# Patient Record
Sex: MALE | Race: WHITE | NOT HISPANIC OR LATINO | Employment: FULL TIME | URBAN - METROPOLITAN AREA
[De-identification: names, ages, dates, MRNs, and addresses within clinical notes are randomized per-mention and may not be internally consistent; named-entity substitution may affect disease eponyms.]

---

## 2020-02-06 LAB — HBA1C MFR BLD HPLC: 5.8 %

## 2020-03-03 RX ORDER — IBUPROFEN 600 MG/1
TABLET ORAL
COMMUNITY
End: 2020-03-09 | Stop reason: ALTCHOICE

## 2020-03-03 RX ORDER — CHLORAL HYDRATE 500 MG
CAPSULE ORAL
COMMUNITY

## 2020-03-03 RX ORDER — VALSARTAN AND HYDROCHLOROTHIAZIDE 160; 12.5 MG/1; MG/1
TABLET, FILM COATED ORAL
COMMUNITY
Start: 2019-08-07 | End: 2020-10-06

## 2020-03-05 ENCOUNTER — TELEPHONE (OUTPATIENT)
Dept: INTERNAL MEDICINE CLINIC | Facility: CLINIC | Age: 60
End: 2020-03-05

## 2020-03-05 ENCOUNTER — OFFICE VISIT (OUTPATIENT)
Dept: INTERNAL MEDICINE CLINIC | Facility: CLINIC | Age: 60
End: 2020-03-05
Payer: COMMERCIAL

## 2020-03-05 VITALS
BODY MASS INDEX: 26.63 KG/M2 | RESPIRATION RATE: 16 BRPM | OXYGEN SATURATION: 95 % | HEART RATE: 77 BPM | HEIGHT: 75 IN | DIASTOLIC BLOOD PRESSURE: 89 MMHG | SYSTOLIC BLOOD PRESSURE: 148 MMHG | WEIGHT: 214.2 LBS

## 2020-03-05 DIAGNOSIS — S76.111A STRAIN OF RIGHT QUADRICEPS, INITIAL ENCOUNTER: ICD-10-CM

## 2020-03-05 DIAGNOSIS — Z23 NEED FOR SHINGLES VACCINE: ICD-10-CM

## 2020-03-05 DIAGNOSIS — R03.0 PRE-HYPERTENSION: ICD-10-CM

## 2020-03-05 DIAGNOSIS — R73.03 PREDIABETES: ICD-10-CM

## 2020-03-05 DIAGNOSIS — Z23 NEED FOR TDAP VACCINATION: Primary | ICD-10-CM

## 2020-03-05 DIAGNOSIS — E66.3 OVERWEIGHT (BMI 25.0-29.9): ICD-10-CM

## 2020-03-05 DIAGNOSIS — K57.90 DIVERTICULOSIS: ICD-10-CM

## 2020-03-05 PROBLEM — I10 HYPERTENSION: Status: ACTIVE | Noted: 2020-03-05

## 2020-03-05 PROBLEM — K58.0 IRRITABLE BOWEL SYNDROME WITH DIARRHEA: Status: ACTIVE | Noted: 2020-03-05

## 2020-03-05 PROCEDURE — 3077F SYST BP >= 140 MM HG: CPT | Performed by: INTERNAL MEDICINE

## 2020-03-05 PROCEDURE — 3079F DIAST BP 80-89 MM HG: CPT | Performed by: INTERNAL MEDICINE

## 2020-03-05 PROCEDURE — 3008F BODY MASS INDEX DOCD: CPT | Performed by: INTERNAL MEDICINE

## 2020-03-05 PROCEDURE — 99203 OFFICE O/P NEW LOW 30 MIN: CPT | Performed by: INTERNAL MEDICINE

## 2020-03-05 PROCEDURE — 1036F TOBACCO NON-USER: CPT | Performed by: INTERNAL MEDICINE

## 2020-03-05 NOTE — PROGRESS NOTES
Assessment/Plan:    Diverticulosis  Currently stable doing well increase fiber in the diet reduce fat in the diet will continue monitor colonoscopy is up-to-date    Strain of right quadriceps  Of the right lower extremity and quadriceps recommended will also have the patient see physical therapy trigger the    Need for shingles vaccine  I have given the patient Rx for Shingrix vaccine that he can receive at the pharmacy    Need for Tdap vaccination  Counseled, patient request Rx for Tdap he will receive an at the pharmacy    Overweight (BMI 25 0-29  9)  I have counselled the pt to follow a healthy and balanced diet ,and recommend routine exercise  Prediabetes  Reduce carbohydrates, reduce sweets routine exercise continue monitor 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 which she will bring a copy to me to monitor  Pre-hypertension  Weight loss, routine exercise, monitor the blood pressure reduce the diet if he notices increase of the blood pressure please notify me immediately will try to optimize his blood pressure target 110-120/60-80         Problem List Items Addressed This Visit        Digestive    Diverticulosis     Currently stable doing well increase fiber in the diet reduce fat in the diet will continue monitor colonoscopy is up-to-date            Musculoskeletal and Integument    Strain of right quadriceps     Of the right lower extremity and quadriceps recommended will also have the patient see physical therapy trigger the         Relevant Orders    Ambulatory referral to Physical Therapy       Other    Overweight (BMI 25 0-29  9)     I have counselled the pt to follow a healthy and balanced diet ,and recommend routine exercise  Prediabetes     Reduce carbohydrates, reduce sweets routine exercise continue monitor 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 which she will bring a copy to me to monitor           Need for Tdap vaccination - Primary     Counseled, patient request Rx for Tdap he will receive an at the pharmacy         Need for shingles vaccine     I have given the patient Rx for Shingrix vaccine that he can receive at the pharmacy         Pre-hypertension     Weight loss, routine exercise, monitor the blood pressure reduce the diet if he notices increase of the blood pressure please notify me immediately will try to optimize his blood pressure target 110-120/60-80               Return to office 12  months  call if any problems  Subjective:      Patient ID: Rene Bray is a 64 y o  male  HPI 65-year old male new patient visit diverticulosis, overweight, prediabetes and pre hypertension; 135-140/80-89 diet healthy diet, fish , pasta, some chips intermittently,  Walking the dog 1/2, , 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 he reports reports me that he get 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  He will bring me copies of these reports  BMI Counseling: Body mass index is 27 13 kg/m²  The BMI is above normal  Nutrition recommendations include decreasing portion sizes and moderation in carbohydrate intake  The following portions of the patient's history were reviewed and updated as appropriate: allergies, current medications, past family history, past medical history, past social history, past surgical history and problem list     Review of Systems   Constitutional: Negative for activity change, appetite change and unexpected weight change  HENT: Negative for congestion and postnasal drip  Eyes: Negative for visual disturbance  Respiratory: Negative for cough and shortness of breath  Cardiovascular: Negative for chest pain  Gastrointestinal: Negative for abdominal pain, diarrhea, nausea and vomiting  Neurological: Negative for dizziness, light-headedness and headaches  Hematological: Negative for adenopathy  Objective:    No follow-ups on file  No results found        Allergies Allergen Reactions    No Active Allergies        History reviewed  No pertinent past medical history  Past Surgical History:   Procedure Laterality Date    THYROIDECTOMY, PARTIAL       Current Outpatient Medications on File Prior to Visit   Medication Sig Dispense Refill    ibuprofen (MOTRIN) 600 mg tablet TAKE ONE TABLET BY MOUTH EVERY 6 HOURS FOR THE first 48 HOURS THEN AS NEEDED FOR PAIN      Olive Leaf Extract 150 MG CAPS olive leaf extract      Omega-3 Fatty Acids (FISH OIL) 1,000 mg Fish Oil      Turmeric 1053 MG TABS turmeric (bulk)      valsartan-hydrochlorothiazide (DIOVAN-HCT) 160-12 5 MG per tablet TAKE 1 TABLET(S) EVERY DAY BY ORAL ROUTE IN THE MORNING  No current facility-administered medications on file prior to visit        Family History   Problem Relation Age of Onset   [facility] Breast cancer Mother     Hypertension Mother     Atrial fibrillation Mother     Coronary artery disease Father     Dementia Father      Social History     Socioeconomic History    Marital status: /Civil Union     Spouse name: Not on file    Number of children: Not on file    Years of education: Not on file    Highest education level: Not on file   Occupational History    Not on file   Social Needs    Financial resource strain: Not on file    Food insecurity:     Worry: Not on file     Inability: Not on file    Transportation needs:     Medical: Not on file     Non-medical: Not on file   Tobacco Use    Smoking status: Never Smoker    Smokeless tobacco: Never Used   Substance and Sexual Activity    Alcohol use: Not on file    Drug use: Not on file    Sexual activity: Not on file   Lifestyle    Physical activity:     Days per week: Not on file     Minutes per session: Not on file    Stress: Not on file   Relationships    Social connections:     Talks on phone: Not on file     Gets together: Not on file     Attends Baptism service: Not on file     Active member of club or organization: Not on file     Attends meetings of clubs or organizations: Not on file     Relationship status: Not on file    Intimate partner violence:     Fear of current or ex partner: Not on file     Emotionally abused: Not on file     Physically abused: Not on file     Forced sexual activity: Not on file   Other Topics Concern    Not on file   Social History Narrative    Not on file     Vitals:    03/05/20 1144   BP: 148/89   Pulse: 77   Resp: 16   SpO2: 95%   Weight: 97 2 kg (214 lb 3 2 oz)   Height: 6' 2 5" (1 892 m)     Results for orders placed or performed in visit on 02/06/20   Hemoglobin A1C   Result Value Ref Range    Hemoglobin A1C 5 8      Weight (last 2 days)     None        Body mass index is 27 13 kg/m²  BP      Temp      Pulse     Resp      SpO2        Vitals:    03/05/20 1144   Weight: 97 2 kg (214 lb 3 2 oz)     Vitals:    03/05/20 1144   Weight: 97 2 kg (214 lb 3 2 oz)       /89   Pulse 77   Resp 16   Ht 6' 2 5" (1 892 m)   Wt 97 2 kg (214 lb 3 2 oz)   SpO2 95%   BMI 27 13 kg/m²          Physical Exam   Constitutional: He appears well-developed and well-nourished  No distress  HENT:   Head: Normocephalic and atraumatic  Right Ear: External ear normal    Left Ear: External ear normal    Mouth/Throat: Oropharynx is clear and moist    Eyes: Pupils are equal, round, and reactive to light  Conjunctivae are normal  Right eye exhibits no discharge  Left eye exhibits no discharge  No scleral icterus  Neck: Neck supple  Cardiovascular: Normal rate, regular rhythm and normal heart sounds  Exam reveals no gallop and no friction rub  No murmur heard  Pulmonary/Chest: No respiratory distress  He has no wheezes  He has no rales  Abdominal: Soft  Bowel sounds are normal  He exhibits no distension and no mass  There is no tenderness  There is no rebound and no guarding  Musculoskeletal: He exhibits no edema or deformity  Lymphadenopathy:     He has no cervical adenopathy     Neurological: He is alert    Skin: He is not diaphoretic  Psychiatric: He has a normal mood and affect

## 2020-03-05 NOTE — TELEPHONE ENCOUNTER
----- Message from Manuel Siddiqi DO sent at 3/5/2020 12:17 PM EST -----  Please get a copy of the last colonoscopy by GI Dr Ariana Rivera

## 2020-03-05 NOTE — TELEPHONE ENCOUNTER
LM on nurse line at Dr Amie Guardado office requesting copy of colonoscopy/pathology report      [phone]

## 2020-03-08 PROBLEM — Z23 NEED FOR TDAP VACCINATION: Status: ACTIVE | Noted: 2020-03-08

## 2020-03-08 PROBLEM — R03.0 PRE-HYPERTENSION: Status: ACTIVE | Noted: 2020-03-08

## 2020-03-08 PROBLEM — Z23 NEED FOR SHINGLES VACCINE: Status: ACTIVE | Noted: 2020-03-08

## 2020-03-08 PROBLEM — S76.111A STRAIN OF RIGHT QUADRICEPS: Status: ACTIVE | Noted: 2020-03-08

## 2020-03-08 NOTE — ASSESSMENT & PLAN NOTE
Currently stable doing well increase fiber in the diet reduce fat in the diet will continue monitor colonoscopy is up-to-date

## 2020-03-08 NOTE — ASSESSMENT & PLAN NOTE
Weight loss, routine exercise, monitor the blood pressure reduce the diet if he notices increase of the blood pressure please notify me immediately will try to optimize his blood pressure target 110-120/60-80

## 2020-03-08 NOTE — ASSESSMENT & PLAN NOTE
Of the right lower extremity and quadriceps recommended will also have the patient see physical therapy trigger the

## 2020-03-08 NOTE — ASSESSMENT & PLAN NOTE
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

## 2020-04-29 DIAGNOSIS — I10 ESSENTIAL HYPERTENSION: Primary | ICD-10-CM

## 2020-04-29 RX ORDER — HYDROCHLOROTHIAZIDE 12.5 MG/1
12.5 TABLET ORAL DAILY
Qty: 30 TABLET | Refills: 0 | Status: SHIPPED | OUTPATIENT
Start: 2020-04-29 | End: 2020-05-18 | Stop reason: SDUPTHER

## 2020-04-29 RX ORDER — VALSARTAN 160 MG/1
160 TABLET ORAL DAILY
Qty: 30 TABLET | Refills: 0 | Status: SHIPPED | OUTPATIENT
Start: 2020-04-29 | End: 2020-05-18 | Stop reason: SDUPTHER

## 2020-05-18 DIAGNOSIS — I10 ESSENTIAL HYPERTENSION: ICD-10-CM

## 2020-05-18 RX ORDER — HYDROCHLOROTHIAZIDE 12.5 MG/1
12.5 TABLET ORAL DAILY
Qty: 90 TABLET | Refills: 0 | Status: SHIPPED | OUTPATIENT
Start: 2020-05-18 | End: 2020-05-21

## 2020-05-18 RX ORDER — VALSARTAN 160 MG/1
160 TABLET ORAL DAILY
Qty: 90 TABLET | Refills: 0 | Status: SHIPPED | OUTPATIENT
Start: 2020-05-18 | End: 2020-05-21

## 2020-05-21 DIAGNOSIS — I10 ESSENTIAL HYPERTENSION: ICD-10-CM

## 2020-05-21 RX ORDER — HYDROCHLOROTHIAZIDE 12.5 MG/1
TABLET ORAL
Qty: 30 TABLET | Refills: 0 | Status: SHIPPED | OUTPATIENT
Start: 2020-05-21 | End: 2020-05-22

## 2020-05-21 RX ORDER — VALSARTAN 160 MG/1
TABLET ORAL
Qty: 30 TABLET | Refills: 0 | Status: SHIPPED | OUTPATIENT
Start: 2020-05-21 | End: 2020-07-09

## 2020-05-22 DIAGNOSIS — I10 ESSENTIAL HYPERTENSION: ICD-10-CM

## 2020-05-22 RX ORDER — HYDROCHLOROTHIAZIDE 12.5 MG/1
TABLET ORAL
Qty: 90 TABLET | Refills: 1 | Status: SHIPPED | OUTPATIENT
Start: 2020-05-22 | End: 2020-07-09

## 2020-07-08 DIAGNOSIS — I10 ESSENTIAL HYPERTENSION: ICD-10-CM

## 2020-07-09 RX ORDER — VALSARTAN 160 MG/1
TABLET ORAL
Qty: 90 TABLET | Refills: 1 | Status: SHIPPED | OUTPATIENT
Start: 2020-07-09 | End: 2020-12-21

## 2020-07-09 RX ORDER — HYDROCHLOROTHIAZIDE 12.5 MG/1
TABLET ORAL
Qty: 90 TABLET | Refills: 1 | Status: SHIPPED | OUTPATIENT
Start: 2020-07-09 | End: 2020-12-21

## 2020-08-31 ENCOUNTER — TELEPHONE (OUTPATIENT)
Dept: INTERNAL MEDICINE CLINIC | Facility: CLINIC | Age: 60
End: 2020-08-31

## 2020-08-31 DIAGNOSIS — Z13.6 SCREENING FOR CARDIOVASCULAR CONDITION: ICD-10-CM

## 2020-08-31 DIAGNOSIS — Z12.5 SCREENING FOR PROSTATE CANCER: Primary | ICD-10-CM

## 2020-08-31 NOTE — TELEPHONE ENCOUNTER
Please have the patient go for a comprehensive metabolic panel, lipid panel and PSA diagnosis prostate cancer screening, cardiovascular screening

## 2020-08-31 NOTE — TELEPHONE ENCOUNTER
Patient said he usually gets b/w done at a [facility] event but he is not going there this year so the pt wants to know if you want him to get b/w done  Pt uses [facility]  Please, advise

## 2020-10-06 ENCOUNTER — OFFICE VISIT (OUTPATIENT)
Dept: INTERNAL MEDICINE CLINIC | Facility: CLINIC | Age: 60
End: 2020-10-06
Payer: COMMERCIAL

## 2020-10-06 VITALS
WEIGHT: 216 LBS | HEIGHT: 75 IN | HEART RATE: 73 BPM | DIASTOLIC BLOOD PRESSURE: 72 MMHG | SYSTOLIC BLOOD PRESSURE: 122 MMHG | OXYGEN SATURATION: 98 % | TEMPERATURE: 97.9 F | BODY MASS INDEX: 26.86 KG/M2

## 2020-10-06 DIAGNOSIS — R73.03 PREDIABETES: Primary | ICD-10-CM

## 2020-10-06 DIAGNOSIS — E78.5 HYPERLIPIDEMIA, UNSPECIFIED HYPERLIPIDEMIA TYPE: ICD-10-CM

## 2020-10-06 DIAGNOSIS — F41.9 MILD ANXIETY: ICD-10-CM

## 2020-10-06 DIAGNOSIS — Z13.29 THYROID DISORDER SCREEN: ICD-10-CM

## 2020-10-06 DIAGNOSIS — E66.3 OVERWEIGHT (BMI 25.0-29.9): ICD-10-CM

## 2020-10-06 DIAGNOSIS — K58.0 IRRITABLE BOWEL SYNDROME WITH DIARRHEA: ICD-10-CM

## 2020-10-06 DIAGNOSIS — Z11.59 ENCOUNTER FOR HEPATITIS C SCREENING TEST FOR LOW RISK PATIENT: ICD-10-CM

## 2020-10-06 PROCEDURE — 99214 OFFICE O/P EST MOD 30 MIN: CPT | Performed by: INTERNAL MEDICINE

## 2020-10-06 PROCEDURE — 1036F TOBACCO NON-USER: CPT | Performed by: INTERNAL MEDICINE

## 2020-10-06 PROCEDURE — 3078F DIAST BP <80 MM HG: CPT | Performed by: INTERNAL MEDICINE

## 2020-12-01 ENCOUNTER — VBI (OUTPATIENT)
Dept: ADMINISTRATIVE | Facility: OTHER | Age: 60
End: 2020-12-01

## 2020-12-21 DIAGNOSIS — I10 ESSENTIAL HYPERTENSION: ICD-10-CM

## 2020-12-21 RX ORDER — HYDROCHLOROTHIAZIDE 12.5 MG/1
TABLET ORAL
Qty: 90 TABLET | Refills: 3 | Status: SHIPPED | OUTPATIENT
Start: 2020-12-21 | End: 2021-11-19

## 2020-12-21 RX ORDER — VALSARTAN 160 MG/1
TABLET ORAL
Qty: 90 TABLET | Refills: 3 | Status: SHIPPED | OUTPATIENT
Start: 2020-12-21 | End: 2021-11-19

## 2021-04-02 ENCOUNTER — TELEPHONE (OUTPATIENT)
Dept: INTERNAL MEDICINE CLINIC | Facility: CLINIC | Age: 61
End: 2021-04-02

## 2021-04-02 NOTE — TELEPHONE ENCOUNTER
The patient would like to have the Hep A and B antibody testing done in order to see if he  needs to have the vaccines  Please advise  Thank you

## 2021-04-06 DIAGNOSIS — Z00.00 HEALTH MAINTENANCE EXAMINATION: Primary | ICD-10-CM

## 2021-04-06 LAB
HBA1C MFR BLD HPLC: 5.6 %
HCV AB SER-ACNC: NON REACTIVE

## 2021-04-09 RX ORDER — TRIAMCINOLONE ACETONIDE 0.1 %
PASTE (GRAM) DENTAL
COMMUNITY
Start: 2021-01-21

## 2021-04-14 ENCOUNTER — OFFICE VISIT (OUTPATIENT)
Dept: INTERNAL MEDICINE CLINIC | Facility: CLINIC | Age: 61
End: 2021-04-14
Payer: COMMERCIAL

## 2021-04-14 VITALS
RESPIRATION RATE: 16 BRPM | SYSTOLIC BLOOD PRESSURE: 130 MMHG | WEIGHT: 214.6 LBS | DIASTOLIC BLOOD PRESSURE: 84 MMHG | BODY MASS INDEX: 26.68 KG/M2 | HEART RATE: 67 BPM | OXYGEN SATURATION: 98 % | HEIGHT: 75 IN

## 2021-04-14 DIAGNOSIS — Z23 NEED FOR DIPHTHERIA-TETANUS-PERTUSSIS (TDAP) VACCINE: ICD-10-CM

## 2021-04-14 DIAGNOSIS — Z11.4 SCREENING FOR HIV (HUMAN IMMUNODEFICIENCY VIRUS): ICD-10-CM

## 2021-04-14 DIAGNOSIS — Z23 ENCOUNTER FOR IMMUNIZATION: ICD-10-CM

## 2021-04-14 DIAGNOSIS — Z00.00 WELLNESS EXAMINATION: ICD-10-CM

## 2021-04-14 DIAGNOSIS — Z11.59 NEED FOR HEPATITIS C SCREENING TEST: ICD-10-CM

## 2021-04-14 DIAGNOSIS — I15.9 SECONDARY HYPERTENSION: Primary | ICD-10-CM

## 2021-04-14 DIAGNOSIS — R73.01 ELEVATED FASTING BLOOD SUGAR: ICD-10-CM

## 2021-04-14 DIAGNOSIS — F41.9 MILD ANXIETY: ICD-10-CM

## 2021-04-14 DIAGNOSIS — E78.5 HYPERLIPIDEMIA, UNSPECIFIED HYPERLIPIDEMIA TYPE: ICD-10-CM

## 2021-04-14 PROCEDURE — 3008F BODY MASS INDEX DOCD: CPT | Performed by: INTERNAL MEDICINE

## 2021-04-14 PROCEDURE — 3725F SCREEN DEPRESSION PERFORMED: CPT | Performed by: INTERNAL MEDICINE

## 2021-04-14 PROCEDURE — 1036F TOBACCO NON-USER: CPT | Performed by: INTERNAL MEDICINE

## 2021-04-14 PROCEDURE — 99396 PREV VISIT EST AGE 40-64: CPT | Performed by: INTERNAL MEDICINE

## 2021-04-14 NOTE — ASSESSMENT & PLAN NOTE
Pre diabetes -I have counseled the patient to follow a healthy balanced diet, check hemoglobin A1c and fasting blood sugar

## 2021-04-14 NOTE — PATIENT INSTRUCTIONS
Try Debrox wax softener as directed p r  n  Heart Healthy Diet   WHAT YOU NEED TO KNOW:   A heart healthy diet is an eating plan low in unhealthy fats and sodium (salt)  The plan is high in healthy fats and fiber  A heart healthy diet helps improve your cholesterol levels and lowers your risk for heart disease and stroke  A dietitian will teach you how to read and understand food labels  DISCHARGE INSTRUCTIONS:   Heart healthy diet guidelines to follow:   · Choose foods that contain healthy fats  ? Unsaturated fats  include monounsaturated and polyunsaturated fats  Unsaturated fat is found in foods such as soybean, canola, olive, corn, and safflower oils  It is also found in soft tub margarine that is made with liquid vegetable oil  ? Omega-3 fat  is found in certain fish, such as salmon, tuna, and trout, and in walnuts and flaxseed  Eat fish high in omega-3 fats at least 2 times a week  · Get 20 to 30 grams of fiber each day  Fruits, vegetables, whole-grain foods, and legumes (cooked beans) are good sources of fiber  · Limit or do not have unhealthy fats  ? Cholesterol  is found in animal foods, such as eggs and lobster, and in dairy products made from whole milk  Limit cholesterol to less than 200 mg each day  ? Saturated fat  is found in meats, such as aj and hamburger  It is also found in chicken or turkey skin, whole milk, and butter  Limit saturated fat to less than 7% of your total daily calories  ? Trans fat  is found in packaged foods, such as potato chips and cookies  It is also in hard margarine, some fried foods, and shortening  Do not eat foods that contain trans fats  · Limit sodium as directed  You may be told to limit sodium to 2,000 to 2,300 mg each day  Choose low-sodium or no-salt-added foods  Add little or no salt to food you prepare  Use herbs and spices in place of salt         Include the following in your heart healthy plan:  Ask your dietitian or healthcare provider how many servings to have from each of the following food groups:  · Grains:      ? Whole-wheat breads, cereals, and pastas, and brown rice    ? Low-fat, low-sodium crackers and chips    · Vegetables:      ? Broccoli, green beans, green peas, and spinach    ? Collards, kale, and lima beans    ? Carrots, sweet potatoes, tomatoes, and peppers    ? Canned vegetables with no salt added    · Fruits:      ? Bananas, peaches, pears, and pineapple    ? Grapes, raisins, and dates    ? Oranges, tangerines, grapefruit, orange juice, and grapefruit juice    ? Apricots, mangoes, melons, and papaya    ? Raspberries and strawberries    ? Canned fruit with no added sugar    · Low-fat dairy:      ? Nonfat (skim) milk, 1% milk, and low-fat almond, cashew, or soy milks fortified with calcium    ? Low-fat cheese, regular or frozen yogurt, and cottage cheese    · Meats and proteins:      ? Lean cuts of beef and pork (loin, leg, round), skinless chicken and turkey    ? Legumes, soy products, egg whites, or nuts    Limit or do not include the following in your heart healthy plan:   · Unhealthy fats and oils:      ? Whole or 2% milk, cream cheese, sour cream, or cheese    ? High-fat cuts of beef (T-bone steaks, ribs), chicken or turkey with skin, and organ meats such as liver    ? Butter, stick margarine, shortening, and cooking oils such as coconut or palm oil    · Foods and liquids high in sodium:      ? Packaged foods, such as frozen dinners, cookies, macaroni and cheese, and cereals with more than 300 mg of sodium per serving    ? Vegetables with added sodium, such as instant potatoes, vegetables with added sauces, or regular canned vegetables    ? Cured or smoked meats, such as hot dogs, aj, and sausage    ? High-sodium ketchup, barbecue sauce, salad dressing, pickles, olives, soy sauce, or miso    · Foods and liquids high in sugar:      ? Candy, cake, cookies, pies, or doughnuts    ?  Soft drinks (soda), sports drinks, or sweetened tea    ? Canned or dry mixes for cakes, soups, sauces, or gravies    Other healthy heart guidelines:   · Do not smoke  Nicotine and other chemicals in cigarettes and cigars can cause lung and heart damage  Ask your healthcare provider for information if you currently smoke and need help to quit  E-cigarettes or smokeless tobacco still contain nicotine  Talk to your healthcare provider before you use these products  · Limit or do not drink alcohol as directed  Alcohol can damage your heart and raise your blood pressure  Your healthcare provider may give you specific daily and weekly limits  The general recommended limit is 1 drink a day for women 21 or older and for men 72 or older  Do not have more than 3 drinks in a day or 7 in a week  The recommended limit is 2 drinks a day for men 24to 59years of age  Do not have more than 4 drinks in a day or 14 in a week  A drink of alcohol is 12 ounces of beer, 5 ounces of wine, or 1½ ounces of liquor  · Exercise regularly  Exercise can help you maintain a healthy weight and improve your blood pressure and cholesterol levels  Regular exercise can also decrease your risk for heart problems  Ask your healthcare provider about the best exercise plan for you  Do not start an exercise program without asking your healthcare provider  Follow up with your doctor or cardiologist as directed:  Write down your questions so you remember to ask them during your visits  © Copyright [address] Information is for End User's use only and may not be sold, redistributed or otherwise used for commercial purposes  All illustrations and images included in CareNotes® are the copyrighted property of A D [employer] M [employer]  or [address] [place], [place] [address]delilah   The above information is an  only  It is not intended as medical advice for individual conditions or treatments   Talk to your doctor, nurse or pharmacist before following any medical regimen to see if it is safe and effective for you

## 2021-04-14 NOTE — ASSESSMENT & PLAN NOTE
Assessment and plan 1  Health maintenance annual wellness examination overall the patient is clinically stable and doing well, we encouraged the patient to follow a healthy and balanced diet  We recommend that the patient exercise routinely approximately 30 minutes 5 times per week   We have reviewed the patient's vaccines and have made recommendations for updates if necessary  He has had the COVID vaccine recommend annual flu shot, he has had the Shingrix vaccine       We will be ordering screening laboratories which are age appropriate  Return to the office in    6 months   call if any problems

## 2021-04-14 NOTE — ASSESSMENT & PLAN NOTE
He reports me mild anxiety related to his daughter's medical condition she has been recently diagnosed with colitis fortunately she is improving currently the patient does not report any depression no SI he would like to hold off on counseling like to hold off on treatment we did talk about healthy outlets including exercise meditation I did ask him monitor symptoms are there is any worsening or changes symptoms please notify me

## 2021-04-14 NOTE — PROGRESS NOTES
Assessment/Plan:    Wellness examination   Assessment and plan 1  Health maintenance annual wellness examination overall the patient is clinically stable and doing well, we encouraged the patient to follow a healthy and balanced diet  We recommend that the patient exercise routinely approximately 30 minutes 5 times per week   We have reviewed the patient's vaccines and have made recommendations for updates if necessary  He has had the COVID vaccine recommend annual flu shot, he has had the Shingrix vaccine       We will be ordering screening laboratories which are age appropriate  Return to the office in    6 months   call if any problems  Hyperlipidemia   Low-cholesterol diet check lipid profile    Elevated fasting blood sugar   Pre diabetes -I have counseled the patient to follow a healthy balanced diet, check hemoglobin A1c and fasting blood sugar    Mild anxiety   He reports me mild anxiety related to his daughter's medical condition she has been recently diagnosed with colitis fortunately she is improving currently the patient does not report any depression no SI he would like to hold off on counseling like to hold off on treatment we did talk about healthy outlets including exercise meditation I did ask him monitor symptoms are there is any worsening or changes symptoms please notify me           Problem List Items Addressed This Visit        Cardiovascular and Mediastinum    Hypertension - Primary    Relevant Orders    Comprehensive metabolic panel       Other    Hyperlipidemia      Low-cholesterol diet check lipid profile         Relevant Orders    Lipid Panel with Direct LDL reflex    Mild anxiety      He reports me mild anxiety related to his daughter's medical condition she has been recently diagnosed with colitis fortunately she is improving currently the patient does not report any depression no SI he would like to hold off on counseling like to hold off on treatment we did talk about healthy outlets including exercise meditation I did ask him monitor symptoms are there is any worsening or changes symptoms please notify me  Elevated fasting blood sugar      Pre diabetes -I have counseled the patient to follow a healthy balanced diet, check hemoglobin A1c and fasting blood sugar         Relevant Orders    Hemoglobin A1C    Wellness examination      Assessment and plan 1  Health maintenance annual wellness examination overall the patient is clinically stable and doing well, we encouraged the patient to follow a healthy and balanced diet  We recommend that the patient exercise routinely approximately 30 minutes 5 times per week   We have reviewed the patient's vaccines and have made recommendations for updates if necessary  He has had the COVID vaccine recommend annual flu shot, he has had the Shingrix vaccine       We will be ordering screening laboratories which are age appropriate  Return to the office in    6 months   call if any problems  Other Visit Diagnoses     Need for hepatitis C screening test        Screening for HIV (human immunodeficiency virus)        Encounter for immunization        Need for diphtheria-tetanus-pertussis (Tdap) vaccine        Relevant Medications    tetanus-diphtheria-acellular pertussis (ADACEL) 5-2-15 5 LF-mcg/0 5 injection            RTO in 6 months call if any problems  Subjective:      Patient ID: Zamzam Vera is a 63 y o  male  HPI 65-year-old male coming in for a wellness examination he reports me overall is doing well he drives a car safely he is trying to follow healthy / balance diet he exercises routinely  He needs a prescription for pharmacy for the Tdap vaccine  He has had his COVID vaccine he is here to review his laboratories    Reports me mild stress related to his daughter's medical condition no SI his symptoms are mild he would prefer no treatment he is working on healthy outlets including exercise stress daughter with colitis , No dep no si  Walking  Talking to wife     The following portions of the patient's history were reviewed and updated as appropriate: allergies, current medications, past family history, past medical history, past social history, past surgical history and problem list     Review of Systems   Constitutional: Negative for activity change, appetite change and unexpected weight change  HENT: Negative for congestion and postnasal drip  Eyes: Negative for visual disturbance  Respiratory: Negative for cough and shortness of breath  Cardiovascular: Negative for chest pain  Gastrointestinal: Negative for abdominal pain, diarrhea, nausea and vomiting  Neurological: Negative for dizziness, light-headedness and headaches  Psychiatric/Behavioral: Negative for suicidal ideas  The patient is nervous/anxious  Objective:    No follow-ups on file  No results found  Allergies   Allergen Reactions    No Active Allergies        History reviewed  No pertinent past medical history  Past Surgical History:   Procedure Laterality Date    THYROIDECTOMY, PARTIAL       Current Outpatient Medications on File Prior to Visit   Medication Sig Dispense Refill    hydrochlorothiazide (HYDRODIURIL) 12 5 mg tablet TAKE 1 TABLET BY MOUTH  DAILY 90 tablet 3    Olive Leaf Extract 150 MG CAPS olive leaf extract      Omega-3 Fatty Acids (FISH OIL) 1,000 mg Fish Oil      triamcinolone (KENALOG) 0 1 % oral topical paste APPLY TO AFFECTED AREA AFTER MEALS AND BEFORE BEDTIME      Turmeric 1053 MG TABS turmeric (bulk)      valsartan (DIOVAN) 160 mg tablet TAKE 1 TABLET BY MOUTH  DAILY 90 tablet 3     No current facility-administered medications on file prior to visit        Family History   Problem Relation Age of Onset   Josesito Carr Breast cancer Mother     Hypertension Mother     Atrial fibrillation Mother     Coronary artery disease Father     Dementia Father      Social History     Socioeconomic History    Marital status: /Civil [employer]     Spouse name: Not on file    Number of children: Not on file    Years of education: Not on file    Highest education level: Not on file   Occupational History    Not on file   Social Needs    Financial resource strain: Not on file    Food insecurity     Worry: Not on file     Inability: Not on file    Transportation needs     Medical: Not on file     Non-medical: Not on file   Tobacco Use    Smoking status: Never Smoker    Smokeless tobacco: Never Used   Substance and Sexual Activity    Alcohol use: Not on file    Drug use: Not on file    Sexual activity: Not on file   Lifestyle    Physical activity     Days per week: Not on file     Minutes per session: Not on file    Stress: Not on file   Relationships    Social connections     Talks on phone: Not on file     Gets together: Not on file     Attends Rastafarian service: Not on file     Active member of club or organization: Not on file     Attends meetings of clubs or organizations: Not on file     Relationship status: Not on file    Intimate partner violence     Fear of current or ex partner: Not on file     Emotionally abused: Not on file     Physically abused: Not on file     Forced sexual activity: Not on file   Other Topics Concern    Not on file   Social History Narrative    Not on file     Vitals:    04/14/21 1127   BP: 130/84   Pulse: 67   Resp: 16   SpO2: 98%   Weight: 97 3 kg (214 lb 9 6 oz)   Height: 6' 2 5" (1 892 m)     Results for orders placed or performed in visit on 02/06/20   Hemoglobin A1C   Result Value Ref Range    Hemoglobin A1C 5 8      Weight (last 2 days)     Date/Time   Weight    04/14/21 1127   97 3 (214 6)            Body mass index is 27 18 kg/m²    BP      Temp      Pulse     Resp      SpO2        Vitals:    04/14/21 1127   Weight: 97 3 kg (214 lb 9 6 oz)     Vitals:    04/14/21 1127   Weight: 97 3 kg (214 lb 9 6 oz)       /84   Pulse 67   Resp 16   Ht 6' 2 5" (1 892 m)   Wt 97 3 kg (214 lb 9 6 oz)   SpO2 98%   BMI 27 18 kg/m²          Physical Exam  Constitutional:       General: He is not in acute distress  Appearance: He is well-developed  He is not diaphoretic  HENT:      Head: Normocephalic and atraumatic  Right Ear: External ear normal       Left Ear: External ear normal    Eyes:      General: No scleral icterus  Right eye: No discharge  Left eye: No discharge  Conjunctiva/sclera: Conjunctivae normal       Pupils: Pupils are equal, round, and reactive to light  Neck:      Musculoskeletal: Neck supple  Cardiovascular:      Rate and Rhythm: Normal rate and regular rhythm  Heart sounds: Normal heart sounds  No murmur  No friction rub  No gallop  Pulmonary:      Effort: No respiratory distress  Breath sounds: No wheezing or rales  Abdominal:      General: Bowel sounds are normal  There is no distension  Palpations: Abdomen is soft  There is no mass  Tenderness: There is no abdominal tenderness  There is no guarding or rebound  Musculoskeletal:         General: No deformity  Lymphadenopathy:      Cervical: No cervical adenopathy  Neurological:      Mental Status: He is alert  Psychiatric:         Mood and Affect: Mood is anxious  Mood is not depressed  Thought Content: Thought content does not include suicidal ideation

## 2021-05-03 ENCOUNTER — TELEPHONE (OUTPATIENT)
Dept: INTERNAL MEDICINE CLINIC | Facility: CLINIC | Age: 61
End: 2021-05-03

## 2021-05-03 DIAGNOSIS — R73.03 PREDIABETES: ICD-10-CM

## 2021-05-03 DIAGNOSIS — E03.9 HYPOTHYROIDISM, UNSPECIFIED TYPE: Primary | ICD-10-CM

## 2021-05-03 DIAGNOSIS — E78.5 HYPERLIPIDEMIA, UNSPECIFIED HYPERLIPIDEMIA TYPE: ICD-10-CM

## 2021-05-03 NOTE — TELEPHONE ENCOUNTER
Patient was in last month  Called to verify we had all the records transferred from [facility] doctor  He was getting his thyroid tested regularly due to having half his thyroid removed  He had lab work tested yearly to see if his blood pressure medication is working properly  Is requesting lab orders to have tests done, in addition to the routine blood work entered  Please advise

## 2021-06-09 ENCOUNTER — TELEPHONE (OUTPATIENT)
Dept: INTERNAL MEDICINE CLINIC | Facility: CLINIC | Age: 61
End: 2021-06-09

## 2021-06-09 NOTE — TELEPHONE ENCOUNTER
Patient called in she completed labs to check Hep A + B  The results are scanned into [employer], would you review and let us know if he needs to have Hep A + B vaccination?     Please advise

## 2021-06-10 NOTE — TELEPHONE ENCOUNTER
Pt states he is unsure if he has had the Hep B vaccine, he possibly has gotten it 20 years ago but again he is unsure  Please advise wether he should get the series? Pt would also need to  the Hep A IgG antibody order  Can you please place order

## 2021-06-10 NOTE — TELEPHONE ENCOUNTER
Yes, he needs the hepatitis B vaccine has he ever had it in the past if yes then he will need a 1 time booster if no then he needs to start a new series    I need 1 more test to determine if he needs hepatitis a vaccine please have patient go for hepatitis A IgG antibody

## 2021-06-11 DIAGNOSIS — Z00.00 HEALTHCARE MAINTENANCE: Primary | ICD-10-CM

## 2021-07-07 ENCOUNTER — TELEPHONE (OUTPATIENT)
Dept: GASTROENTEROLOGY | Facility: CLINIC | Age: 61
End: 2021-07-07

## 2021-07-07 NOTE — TELEPHONE ENCOUNTER
LMOM asking patient to return our call to schedule recall [id number] with Dr Mellissa May  Will call again in 1 week if we do not hear from him

## 2021-07-07 NOTE — TELEPHONE ENCOUNTER
----- Message from Liliana Hansen sent at 7/7/2021  8:34 AM EDT -----  Regarding: colon recall  Please check with pt regarding colon recall?   Thanks SB

## 2021-07-12 ENCOUNTER — TELEPHONE (OUTPATIENT)
Dept: INTERNAL MEDICINE CLINIC | Facility: CLINIC | Age: 61
End: 2021-07-12

## 2021-07-12 NOTE — TELEPHONE ENCOUNTER
Pt called to confirm if he can have the   Hep A and Hep B vaccines at this time  He is aware that you are not in the office today  Please advise

## 2021-07-14 NOTE — TELEPHONE ENCOUNTER
Spoke to Pt  He does not wish to schedule at this time but will call us when he does  His recall was for 5 years but Dr Bello Chand note was 5 to 10 yrs

## 2021-10-06 ENCOUNTER — OFFICE VISIT (OUTPATIENT)
Dept: INTERNAL MEDICINE CLINIC | Facility: CLINIC | Age: 61
End: 2021-10-06
Payer: COMMERCIAL

## 2021-10-06 VITALS
SYSTOLIC BLOOD PRESSURE: 144 MMHG | WEIGHT: 208 LBS | HEART RATE: 61 BPM | TEMPERATURE: 96.6 F | BODY MASS INDEX: 26.35 KG/M2 | OXYGEN SATURATION: 96 % | DIASTOLIC BLOOD PRESSURE: 92 MMHG

## 2021-10-06 DIAGNOSIS — Z12.5 SCREENING PSA (PROSTATE SPECIFIC ANTIGEN): ICD-10-CM

## 2021-10-06 DIAGNOSIS — Z12.11 SCREENING FOR MALIGNANT NEOPLASM OF COLON: Primary | ICD-10-CM

## 2021-10-06 DIAGNOSIS — I15.9 SECONDARY HYPERTENSION: ICD-10-CM

## 2021-10-06 DIAGNOSIS — R73.03 PREDIABETES: ICD-10-CM

## 2021-10-06 DIAGNOSIS — Z13.29 THYROID DISORDER SCREEN: ICD-10-CM

## 2021-10-06 DIAGNOSIS — E78.5 HYPERLIPIDEMIA, UNSPECIFIED HYPERLIPIDEMIA TYPE: ICD-10-CM

## 2021-10-06 PROCEDURE — 1036F TOBACCO NON-USER: CPT | Performed by: INTERNAL MEDICINE

## 2021-10-06 PROCEDURE — 99214 OFFICE O/P EST MOD 30 MIN: CPT | Performed by: INTERNAL MEDICINE

## 2021-11-18 DIAGNOSIS — I10 ESSENTIAL HYPERTENSION: ICD-10-CM

## 2021-11-19 RX ORDER — HYDROCHLOROTHIAZIDE 12.5 MG/1
TABLET ORAL
Qty: 90 TABLET | Refills: 3 | Status: SHIPPED | OUTPATIENT
Start: 2021-11-19

## 2021-11-19 RX ORDER — VALSARTAN 160 MG/1
TABLET ORAL
Qty: 90 TABLET | Refills: 3 | Status: SHIPPED | OUTPATIENT
Start: 2021-11-19

## 2022-08-18 ENCOUNTER — OFFICE VISIT (OUTPATIENT)
Dept: INTERNAL MEDICINE CLINIC | Facility: CLINIC | Age: 62
End: 2022-08-18
Payer: COMMERCIAL

## 2022-08-18 VITALS
HEIGHT: 75 IN | DIASTOLIC BLOOD PRESSURE: 88 MMHG | BODY MASS INDEX: 25.36 KG/M2 | WEIGHT: 204 LBS | RESPIRATION RATE: 16 BRPM | SYSTOLIC BLOOD PRESSURE: 130 MMHG | HEART RATE: 76 BPM | OXYGEN SATURATION: 96 %

## 2022-08-18 DIAGNOSIS — F41.9 ANXIETY: Primary | ICD-10-CM

## 2022-08-18 PROCEDURE — 99213 OFFICE O/P EST LOW 20 MIN: CPT | Performed by: INTERNAL MEDICINE

## 2022-08-18 PROCEDURE — 3725F SCREEN DEPRESSION PERFORMED: CPT | Performed by: INTERNAL MEDICINE

## 2022-08-18 RX ORDER — SERTRALINE HYDROCHLORIDE 25 MG/1
25 TABLET, FILM COATED ORAL DAILY
Qty: 30 TABLET | Refills: 3 | Status: SHIPPED | OUTPATIENT
Start: 2022-08-18 | End: 2022-10-11

## 2022-08-18 NOTE — PROGRESS NOTES
Assessment/Plan:    Anxiety  · Increased stress for the last week causing headache, racing thoughts, decreased appetite, impaired sleep  · normal TSH on 6/10  · Encouraged stress modifying activities like breathing exercises and walking  · Prescribed Zoloft 25 mg daily  · OTC Melatonin HS  · Follow up in 4 weeks         Problem List Items Addressed This Visit        Other    Anxiety - Primary     · Increased stress for the last week causing headache, racing thoughts, decreased appetite, impaired sleep  · normal TSH on 6/10  · Encouraged stress modifying activities like breathing exercises and walking  · Prescribed Zoloft 25 mg daily  · OTC Melatonin HS  · Follow up in 4 weeks         Relevant Medications    sertraline (Zoloft) 25 mg tablet            Subjective:      Patient ID: Stephany Hall is a 63 y o  male  63year old male with history of HTN who presents to discuss increased anxiety  He endorses increased stress in his life in the last week that is causing impaired sleep, headache, and poor appetite  He has also been having sensation of globus infrequently with taking pills  Reports that he normally sleeps about 7 hours but over the last week has only been getting 4-5 hours  He has no issue falling asleep however wakes up frequently and has racing worrying thoughts  Due to decrease in appetite he has lost 4 lbs recently as well  He has been having frontal headaches which are relieved with tylenol  Endorses snoring but denies daytime hypersomnolence  Denies fatigue, palpitations, dark stool, chest pain or lightheadedness  The following portions of the patient's history were reviewed and updated as appropriate: allergies, current medications, past family history, past medical history, past social history, past surgical history and problem list     Review of Systems   Constitutional: Positive for appetite change  Negative for fatigue  Respiratory: Negative for shortness of breath      Cardiovascular: Negative for chest pain, palpitations and leg swelling  Gastrointestinal: Negative for abdominal pain, blood in stool and diarrhea  Neurological: Negative for dizziness, syncope and light-headedness  Psychiatric/Behavioral: Positive for sleep disturbance  The patient is nervous/anxious  Objective:    Return in about 4 weeks (around 9/15/2022)  No results found  Allergies   Allergen Reactions    No Active Allergies        History reviewed  No pertinent past medical history  Past Surgical History:   Procedure Laterality Date    THYROIDECTOMY, PARTIAL       Current Outpatient Medications on File Prior to Visit   Medication Sig Dispense Refill    hydrochlorothiazide (HYDRODIURIL) 12 5 mg tablet TAKE 1 TABLET BY MOUTH  DAILY 90 tablet 3    Olive Leaf Extract 150 MG CAPS olive leaf extract      Omega-3 Fatty Acids (FISH OIL) 1,000 mg Fish Oil      Turmeric 1053 MG TABS turmeric (bulk)      valsartan (DIOVAN) 160 mg tablet TAKE 1 TABLET BY MOUTH  DAILY 90 tablet 3    [DISCONTINUED] triamcinolone (KENALOG) 0 1 % oral topical paste APPLY TO AFFECTED AREA AFTER MEALS AND BEFORE BEDTIME (Patient not taking: Reported on 10/6/2021)       No current facility-administered medications on file prior to visit  Family History   Problem Relation Age of Onset   Beth Nine Breast cancer Mother     Hypertension Mother     Atrial fibrillation Mother     Coronary artery disease Father     Dementia Father      Social History     Socioeconomic History    Marital status: /Civil Union     Spouse name: Not on file    Number of children: Not on file    Years of education: Not on file    Highest education level: Not on file   Occupational History    Not on file   Tobacco Use    Smoking status: Never Smoker    Smokeless tobacco: Never Used   Vaping Use    Vaping Use: Never used   Substance and Sexual Activity    Alcohol use:  Yes     Alcohol/week: 5 0 standard drinks     Types: 5 Cans of beer per week Comment: occational    Drug use: Never    Sexual activity: Yes   Other Topics Concern    Not on file   Social History Narrative    Not on file     Social Determinants of Health     Financial Resource Strain: Not on file   Food Insecurity: Not on file   Transportation Needs: Not on file   Physical Activity: Not on file   Stress: Not on file   Social Connections: Not on file   Intimate Partner Violence: Not on file   Housing Stability: Not on file     Vitals:    08/18/22 0956   BP: 130/88   Pulse: 76   Resp: 16   SpO2: 96%   Weight: 92 5 kg (204 lb)   Height: 6' 2 5" (1 892 m)     Results for orders placed or performed in visit on 09/28/21   Hemoglobin A1C   Result Value Ref Range    Hemoglobin A1C 5 6      Weight (last 2 days)     Date/Time Weight    08/18/22 0956 92 5 (204)        Body mass index is 25 84 kg/m²  BP      Temp      Pulse     Resp      SpO2        Vitals:    08/18/22 0956   Weight: 92 5 kg (204 lb)     Vitals:    08/18/22 0956   Weight: 92 5 kg (204 lb)       /88   Pulse 76   Resp 16   Ht 6' 2 5" (1 892 m)   Wt 92 5 kg (204 lb)   SpO2 96%   BMI 25 84 kg/m²          Physical Exam  Vitals reviewed  Constitutional:       General: He is not in acute distress  Appearance: He is well-developed  He is not ill-appearing  HENT:      Head: Normocephalic and atraumatic  Eyes:      Extraocular Movements: Extraocular movements intact  Conjunctiva/sclera: Conjunctivae normal    Cardiovascular:      Rate and Rhythm: Normal rate and regular rhythm  Pulses: Normal pulses  Heart sounds: Normal heart sounds  Pulmonary:      Effort: Pulmonary effort is normal  No respiratory distress  Breath sounds: Normal breath sounds  Abdominal:      General: Bowel sounds are normal  There is no distension  Palpations: Abdomen is soft  Tenderness: There is no abdominal tenderness  Musculoskeletal:         General: No swelling or tenderness  Normal range of motion  Cervical back: Normal range of motion and neck supple  Skin:     General: Skin is warm and dry  Findings: No rash  Neurological:      General: No focal deficit present  Mental Status: He is alert and oriented to person, place, and time

## 2022-08-18 NOTE — ASSESSMENT & PLAN NOTE
· Increased stress for the last week causing headache, racing thoughts, decreased appetite, impaired sleep  · normal TSH on 6/10  · Encouraged stress modifying activities like breathing exercises and walking  · Prescribed Zoloft 25 mg daily  · OTC Melatonin HS  · Follow up in 4 weeks

## 2022-10-07 ENCOUNTER — RA CDI HCC (OUTPATIENT)
Dept: OTHER | Facility: HOSPITAL | Age: 62
End: 2022-10-07

## 2022-10-07 NOTE — PROGRESS NOTES
[facility]  coding opportunities       Chart reviewed, no opportunity found: CHART REVIEWED, NO OPPORTUNITY FOUND        Patients Insurance        Commercial Insurance: [address] [place] [address]

## 2022-10-11 ENCOUNTER — OFFICE VISIT (OUTPATIENT)
Dept: INTERNAL MEDICINE CLINIC | Facility: CLINIC | Age: 62
End: 2022-10-11
Payer: COMMERCIAL

## 2022-10-11 VITALS
HEART RATE: 69 BPM | BODY MASS INDEX: 25.54 KG/M2 | SYSTOLIC BLOOD PRESSURE: 126 MMHG | OXYGEN SATURATION: 98 % | DIASTOLIC BLOOD PRESSURE: 82 MMHG | WEIGHT: 205.4 LBS | HEIGHT: 75 IN

## 2022-10-11 DIAGNOSIS — F41.9 ANXIETY: ICD-10-CM

## 2022-10-11 DIAGNOSIS — E78.5 HYPERLIPIDEMIA, UNSPECIFIED HYPERLIPIDEMIA TYPE: ICD-10-CM

## 2022-10-11 DIAGNOSIS — R73.03 PREDIABETES: ICD-10-CM

## 2022-10-11 DIAGNOSIS — Z00.00 WELLNESS EXAMINATION: Primary | ICD-10-CM

## 2022-10-11 DIAGNOSIS — R97.20 INCREASED PROSTATE SPECIFIC ANTIGEN (PSA) VELOCITY: ICD-10-CM

## 2022-10-11 DIAGNOSIS — Z13.29 THYROID DISORDER SCREEN: ICD-10-CM

## 2022-10-11 PROCEDURE — 99396 PREV VISIT EST AGE 40-64: CPT | Performed by: INTERNAL MEDICINE

## 2022-10-11 RX ORDER — CHOLECALCIFEROL (VITAMIN D3) 125 MCG
5 CAPSULE ORAL
COMMUNITY

## 2022-10-11 NOTE — PROGRESS NOTES
[facility]    NAME: Natalie Feliciano  AGE: 63 y o  SEX: male  : 1960     DATE: 10/11/2022     Assessment and Plan:     Problem List Items Addressed This Visit        Other    Prediabetes     Pre diabetes -I have counseled the patient to follow a healthy balanced diet, I have counseled patient reduce carbohydrates and sweets in the diet, I would like the patient exercise routinely  I will be checking hemoglobin A1c and comprehensive metabolic panel  Have counseled patient about the prevention of diabetes, and the risk of progression to type 2 diabetes  Relevant Orders    Comprehensive metabolic panel    Thyroid disorder screen     Will check 3rd generation TSH and free T4         Relevant Orders    TSH, 3rd generation with Free T4 reflex    Hyperlipidemia     Hyperlipidemia controlled continue with current medical regiment recommend a low-cholesterol diet and recommend routine exercise we will continue to monitor the progress  Relevant Orders    Lipid Panel with Direct LDL reflex    Wellness examination - Primary     Assessment and plan 1  Health maintenance annual wellness examination overall the patient is clinically stable and doing well, we encouraged the patient to follow a healthy and balanced diet  We recommend that the patient exercise routinely approximately 30 minutes 5 times per week   We have reviewed the patient's vaccines and have made recommendations for updates if necessary annual flu shot, consider COVID booster       We will be ordering screening laboratories which are age appropriate  Return to the office in   6 month   call if any problems           Anxiety     No SI patient reports me improvements of his symptoms using over-the-counter Lavender oil         Increased prostate specific antigen (PSA) velocity     Recheck PSA and see Urology         Relevant Orders    Ambulatory Referral to Urology    PSA, Total Screen      Return to office 6  months  call if any problems  lavander oil anxiety better sleeping better with melatonin breathing exercizes  Going to doctor worked up  Immunizations and preventive care screenings were discussed with patient today  Appropriate education was printed on patient's after visit summary  Tinging in the feet when anxious , father with anxiety    Discussed risks and benefits of prostate cancer screening  We discussed the controversial history of PSA screening for prostate cancer in the [place] as well as the risk of over detection and over treatment of prostate cancer by way of PSA screening  The patient understands that PSA blood testing is an imperfect way to screen for prostate cancer and that elevated PSA levels in the blood may also be caused by infection, inflammation, prostatic trauma or manipulation, urological procedures, or by benign prostatic enlargement  The role of the digital rectal examination in prostate cancer screening was also discussed and I discussed with him that there is large interobserver variability in the findings of digital rectal examination  Counseling:  Exercise: the importance of regular exercise/physical activity was discussed  Recommend exercise 3-5 times per week for at least 30 minutes  BMI Counseling: Body mass index is 26 02 kg/m²  The BMI is above normal  Nutrition recommendations include decreasing portion sizes and moderation in carbohydrate intake  Exercise recommendations include exercising 3-5 times per week  Rationale for BMI follow-up plan is due to patient being overweight or obese  Return in about 6 months (around 4/11/2023)  Chief Complaint:     Chief Complaint   Patient presents with   • Physical Exam      History of Present Illness:     Adult Annual Physical   Patient here for a comprehensive physical exam  The patient reports no problems      Diet and Physical Activity  Diet/Nutrition: well balanced diet  Exercise: dog walking and very active with job  Depression Screening  PHQ-2/9 Depression Screening    Little interest or pleasure in doing things: 0 - not at all  Feeling down, depressed, or hopeless: 0 - not at all  PHQ-2 Score: 0  PHQ-2 Interpretation: Negative depression screen       General Health  Sleep: sleeps well  6-7 hours   Hearing: normal - bilateral   Vision: no vision problems  Dental: regular dental visits  Brush and floss routinely     Health  Symptoms include: none     Review of Systems:     Review of Systems   Constitutional: Negative for activity change, appetite change and unexpected weight change  HENT: Negative for congestion and postnasal drip  Eyes: Negative for visual disturbance  Respiratory: Negative for cough and shortness of breath  Cardiovascular: Negative for chest pain  Gastrointestinal: Negative for abdominal pain, diarrhea, nausea and vomiting  Neurological: Negative for dizziness, light-headedness and headaches  Psychiatric/Behavioral: The patient is not nervous/anxious  Past Medical History:     History reviewed  No pertinent past medical history  Past Surgical History:     Past Surgical History:   Procedure Laterality Date   • THYROIDECTOMY, PARTIAL        Family History:     Family History   Problem Relation Age of Onset   • Breast cancer Mother    • Hypertension Mother    • Atrial fibrillation Mother    • Coronary artery disease Father    • Dementia Father       Social History:     Social History     Socioeconomic History   • Marital status: /Civil Union     Spouse name: None   • Number of children: None   • Years of education: None   • Highest education level: None   Occupational History   • None   Tobacco Use   • Smoking status: Never Smoker   • Smokeless tobacco: Never Used   Vaping Use   • Vaping Use: Never used   Substance and Sexual Activity   • Alcohol use:  Yes     Alcohol/week: 5 0 standard drinks     Types: 5 Cans of beer per week     Comment: occational   • Drug use: Never   • Sexual activity: Yes   Other Topics Concern   • None   Social History Narrative   • None     Social Determinants of Health     Financial Resource Strain: Not on file   Food Insecurity: Not on file   Transportation Needs: Not on file   Physical Activity: Not on file   Stress: Not on file   Social Connections: Not on file   Intimate Partner Violence: Not on file   Housing Stability: Not on file      Current Medications:     Current Outpatient Medications   Medication Sig Dispense Refill   • hydrochlorothiazide (HYDRODIURIL) 12 5 mg tablet TAKE 1 TABLET BY MOUTH  DAILY 90 tablet 3   • Melatonin 5 MG TABS Take 5 mg by mouth 1 -2 tabs as needed     • NON FORMULARY 1 tablet daily Calm Aide     • Olive Leaf Extract 150 MG CAPS olive leaf extract     • Omega-3 Fatty Acids (FISH OIL) 1,000 mg Fish Oil     • Turmeric 1053 MG TABS turmeric (bulk)     • valsartan (DIOVAN) 160 mg tablet TAKE 1 TABLET BY MOUTH  DAILY 90 tablet 3     No current facility-administered medications for this visit  Allergies: Allergies   Allergen Reactions   • No Active Allergies       Physical Exam:     /82   Pulse 69   Ht 6' 2 5" (1 892 m)   Wt 93 2 kg (205 lb 6 4 oz)   SpO2 98%   BMI 26 02 kg/m²     Physical Exam  Constitutional:       General: He is not in acute distress  Appearance: Normal appearance  He is well-developed and normal weight  He is not ill-appearing, toxic-appearing or diaphoretic  HENT:      Head: Normocephalic and atraumatic  Right Ear: External ear normal       Left Ear: External ear normal       Nose: Nose normal    Eyes:      Pupils: Pupils are equal, round, and reactive to light  Cardiovascular:      Rate and Rhythm: Normal rate and regular rhythm  Heart sounds: Normal heart sounds  No murmur heard  Pulmonary:      Effort: Pulmonary effort is normal       Breath sounds: Normal breath sounds     Abdominal:      General: There is no distension  Palpations: Abdomen is soft  Tenderness: There is no abdominal tenderness  There is no guarding  Neurological:      Mental Status: He is alert  Psychiatric:         Mood and Affect: Mood is anxious  Mood is not depressed  Thought Content: Thought content does not include suicidal ideation            Ace Mehta DO  [facility]

## 2022-10-12 NOTE — ASSESSMENT & PLAN NOTE
Assessment and plan 1  Health maintenance annual wellness examination overall the patient is clinically stable and doing well, we encouraged the patient to follow a healthy and balanced diet  We recommend that the patient exercise routinely approximately 30 minutes 5 times per week   We have reviewed the patient's vaccines and have made recommendations for updates if necessary annual flu shot, consider COVID booster       We will be ordering screening laboratories which are age appropriate  Return to the office in   6 month   call if any problems

## 2022-10-21 DIAGNOSIS — I10 ESSENTIAL HYPERTENSION: ICD-10-CM

## 2022-10-22 RX ORDER — VALSARTAN 160 MG/1
TABLET ORAL
Qty: 90 TABLET | Refills: 3 | Status: SHIPPED | OUTPATIENT
Start: 2022-10-22

## 2022-10-22 RX ORDER — HYDROCHLOROTHIAZIDE 12.5 MG/1
TABLET ORAL
Qty: 90 TABLET | Refills: 3 | Status: SHIPPED | OUTPATIENT
Start: 2022-10-22

## 2022-11-21 ENCOUNTER — OFFICE VISIT (OUTPATIENT)
Dept: UROLOGY | Facility: AMBULATORY SURGERY CENTER | Age: 62
End: 2022-11-21

## 2022-11-21 VITALS
WEIGHT: 205 LBS | RESPIRATION RATE: 18 BRPM | SYSTOLIC BLOOD PRESSURE: 136 MMHG | OXYGEN SATURATION: 96 % | HEART RATE: 71 BPM | HEIGHT: 74 IN | BODY MASS INDEX: 26.31 KG/M2 | DIASTOLIC BLOOD PRESSURE: 86 MMHG

## 2022-11-21 DIAGNOSIS — R97.20 INCREASED PROSTATE SPECIFIC ANTIGEN (PSA) VELOCITY: ICD-10-CM

## 2022-11-21 DIAGNOSIS — R97.20 ELEVATED PSA: Primary | ICD-10-CM

## 2022-11-21 NOTE — PROGRESS NOTES
11/21/2022      Chief Complaint   Patient presents with   • New Patient Visit     Assessment and Plan    63 y o  male managed by NEW PATIENT    1  Elevated PSA   · PSA performed 10/13/2022 resulted 1 97  · KESHAWN-prostate 35-40 g with no nodules  Smooth symmetrical   Nontender  · Will repeat PSA in 2 months  If unremarkable will then repeat in 1 year with KESHAWN  · We discussed potential causes for elevation to PSA  He is aware to avoid these activities prior to next PSA check  · Follow up the office in 2 months      History of Present Illness  Momo Carlos is a 63 y o  male here for follow up evaluation of  elevated PSA  Patient PSA trend below  Patient denies all lower urinary tract symptoms  He denies a family history of prostate cancer  Denies scrotal/testicular pain and discomfort  He denies erectile dysfunction  PSA trend  10/13/2022 resulted 1 97  06/10/2022 resulted 3 42  09/09/2020 resulted 1 7      Review of Systems   Constitutional: Negative for chills and fever  Respiratory: Negative for cough and shortness of breath  Cardiovascular: Negative for chest pain  Gastrointestinal: Negative for abdominal distention, abdominal pain, blood in stool, nausea and vomiting  Genitourinary: Negative for difficulty urinating, dysuria, enuresis, flank pain, frequency, hematuria and urgency  Skin: Negative for rash             AUA SYMPTOM SCORE    Flowsheet Row Most Recent Value   AUA SYMPTOM SCORE    How often have you had a sensation of not emptying your bladder completely after you finished urinating? 0 (P)     How often have you had to urinate again less than two hours after you finished urinating? 0 (P)     How often have you found you stopped and started again several times when you urinate? 0 (P)     How often have you found it difficult to postpone urination? 0 (P)     How often have you had a weak urinary stream? 0 (P)     How often have you had to push or strain to begin urination? 0 (P) How many times did you most typically get up to urinate from the time you went to bed at night until the time you got up in the morning? 1 (P)     Quality of Life: If you were to spend the rest of your life with your urinary condition just the way it is now, how would you feel about that? 1 (P)     AUA SYMPTOM SCORE 1 (P)              Past Medical History  History reviewed  No pertinent past medical history  Past Social History  Past Surgical History:   Procedure Laterality Date   • THYROIDECTOMY, PARTIAL       Social History     Tobacco Use   Smoking Status Never   Smokeless Tobacco Never       Past Family History  Family History   Problem Relation Age of Onset   • Breast cancer Mother    • Hypertension Mother    • Atrial fibrillation Mother    • Coronary artery disease Father    • Dementia Father        Past Social history  Social History     Socioeconomic History   • Marital status: /Civil Union     Spouse name: Not on file   • Number of children: Not on file   • Years of education: Not on file   • Highest education level: Not on file   Occupational History   • Not on file   Tobacco Use   • Smoking status: Never   • Smokeless tobacco: Never   Vaping Use   • Vaping Use: Never used   Substance and Sexual Activity   • Alcohol use:  Yes     Alcohol/week: 5 0 standard drinks     Types: 5 Cans of beer per week     Comment: occational   • Drug use: Never   • Sexual activity: Yes   Other Topics Concern   • Not on file   Social History Narrative   • Not on file     Social Determinants of Health     Financial Resource Strain: Not on file   Food Insecurity: Not on file   Transportation Needs: Not on file   Physical Activity: Not on file   Stress: Not on file   Social Connections: Not on file   Intimate Partner Violence: Not on file   Housing Stability: Not on file       Current Medications  Current Outpatient Medications   Medication Sig Dispense Refill   • hydrochlorothiazide (HYDRODIURIL) 12 5 mg tablet TAKE 1 TABLET BY MOUTH  DAILY 90 tablet 3   • Melatonin 5 MG TABS Take 5 mg by mouth 1 -2 tabs as needed     • NON FORMULARY 1 tablet daily Calm Aide     • Olive Leaf Extract 150 MG CAPS olive leaf extract     • Omega-3 Fatty Acids (FISH OIL) 1,000 mg Fish Oil     • Turmeric 1053 MG TABS turmeric (bulk)     • valsartan (DIOVAN) 160 mg tablet TAKE 1 TABLET BY MOUTH  DAILY 90 tablet 3     No current facility-administered medications for this visit  Allergies  Allergies   Allergen Reactions   • No Active Allergies          The following portions of the patient's history were reviewed and updated as appropriate: allergies, current medications, past medical history, past social history, past surgical history and problem list       Vitals  Vitals:    11/21/22 1323   BP: 136/86   BP Location: Right arm   Patient Position: Sitting   Cuff Size: Standard   Pulse: 71   Resp: 18   SpO2: 96%   Weight: 93 kg (205 lb)   Height: 6' 2" (1 88 m)     Physical Exam  Physical Exam  Vitals reviewed  Constitutional:       General: He is not in acute distress  Appearance: Normal appearance  He is normal weight  HENT:      Head: Normocephalic  Cardiovascular:      Rate and Rhythm: Normal rate  Pulmonary:      Effort: No respiratory distress  Breath sounds: Normal breath sounds  Genitourinary:     Penis: Normal and circumcised  Testes: Normal          Right: Mass, tenderness or swelling not present  Left: Mass, tenderness or swelling not present  Epididymis:      Right: Normal  Not inflamed or enlarged  Left: Normal  Not inflamed or enlarged  Comments: KESHAWN-prostate 35-40 g with no nodules  Smooth, symmetric nontender  Normal rectal tone  Skin:     General: Skin is warm and dry  Neurological:      General: No focal deficit present  Mental Status: He is alert and oriented to person, place, and time     Psychiatric:         Mood and Affect: Mood normal          Behavior: Behavior normal  Results  No results found for this or any previous visit (from the past 1 hour(s))  ]  No results found for: PSA  No results found for: GLUCOSE, CALCIUM, NA, K, CO2, CL, BUN, CREATININE  No results found for: WBC, HGB, HCT, MCV, PLT    Orders  Orders Placed This Encounter   Procedures   • PSA Total, Diagnostic     Standing Status:   Future     Standing Expiration Date:   11/21/2023       TIM Cabezas

## 2023-04-18 DIAGNOSIS — R20.0 NUMBNESS: Primary | ICD-10-CM

## 2023-05-10 DIAGNOSIS — F41.9 ANXIETY: ICD-10-CM

## 2023-05-10 RX ORDER — BUSPIRONE HYDROCHLORIDE 5 MG/1
TABLET ORAL
Qty: 180 TABLET | Refills: 2 | Status: SHIPPED | OUTPATIENT
Start: 2023-05-10

## 2023-06-28 ENCOUNTER — OFFICE VISIT (OUTPATIENT)
Dept: INTERNAL MEDICINE CLINIC | Facility: CLINIC | Age: 63
End: 2023-06-28
Payer: COMMERCIAL

## 2023-06-28 VITALS
WEIGHT: 206.6 LBS | RESPIRATION RATE: 16 BRPM | SYSTOLIC BLOOD PRESSURE: 132 MMHG | DIASTOLIC BLOOD PRESSURE: 80 MMHG | HEIGHT: 74 IN | OXYGEN SATURATION: 97 % | HEART RATE: 82 BPM | BODY MASS INDEX: 26.51 KG/M2

## 2023-06-28 DIAGNOSIS — E78.5 HYPERLIPIDEMIA, UNSPECIFIED HYPERLIPIDEMIA TYPE: ICD-10-CM

## 2023-06-28 DIAGNOSIS — K42.9 UMBILICAL HERNIA WITHOUT OBSTRUCTION AND WITHOUT GANGRENE: ICD-10-CM

## 2023-06-28 DIAGNOSIS — F41.9 ANXIETY: ICD-10-CM

## 2023-06-28 DIAGNOSIS — I15.9 SECONDARY HYPERTENSION: ICD-10-CM

## 2023-06-28 DIAGNOSIS — N40.0 BENIGN PROSTATIC HYPERPLASIA, UNSPECIFIED WHETHER LOWER URINARY TRACT SYMPTOMS PRESENT: Primary | ICD-10-CM

## 2023-06-28 PROCEDURE — 99214 OFFICE O/P EST MOD 30 MIN: CPT | Performed by: INTERNAL MEDICINE

## 2023-06-28 RX ORDER — BUSPIRONE HYDROCHLORIDE 5 MG/1
5 TABLET ORAL 2 TIMES DAILY PRN
Qty: 180 TABLET | Refills: 2 | Status: SHIPPED | OUTPATIENT
Start: 2023-06-28
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
## 2023-06-30 ENCOUNTER — TELEPHONE (OUTPATIENT)
Dept: PSYCHIATRY | Facility: CLINIC | Age: 63
End: 2023-06-30

## 2023-06-30 NOTE — TELEPHONE ENCOUNTER
Contacted patient in regards to Routine Referral in attempts to verify patient's needs of services and add patient to proper wait list. spoke with patient whom stated they are currently seeing a provider through pcp office. Writer notified patient they were a therapist and this referral was for med mgmt services. Patient stated they would like to continue with therapist and not pursue med mgmt at this time.  Writer notified patient referral is good for a year and if anything changes to contact office

## 2023-07-02 PROBLEM — K42.9 UMBILICAL HERNIA WITHOUT OBSTRUCTION AND WITHOUT GANGRENE: Status: ACTIVE | Noted: 2023-07-02

## 2023-07-02 PROBLEM — N40.0 BENIGN PROSTATIC HYPERPLASIA: Status: ACTIVE | Noted: 2023-07-02

## 2023-07-02 NOTE — ASSESSMENT & PLAN NOTE
No SI Rx BuSpar 5 mg twice daily as needed patient reports previous symptoms of anxiety and posttraumatic stress disorder we will have patient see counselor Yanet Jiménez

## 2023-07-02 NOTE — ASSESSMENT & PLAN NOTE
Hypertension - controlled, I have counseled patient following healthy balance diet, I would like the patient reduce sodium, exercise routinely, I would like the patient continued the med current medical regiment and we will continue to monitor.   Continue Diovan 160 mg once daily

## 2023-07-02 NOTE — ASSESSMENT & PLAN NOTE
We will have patient see general surgeon MARYJANE Russo  If symptoms change or worsen please notify me immediately

## 2023-07-17 ENCOUNTER — CONSULT (OUTPATIENT)
Dept: SURGERY | Facility: CLINIC | Age: 63
End: 2023-07-17
Payer: COMMERCIAL

## 2023-07-17 ENCOUNTER — PREP FOR PROCEDURE (OUTPATIENT)
Dept: SURGERY | Facility: CLINIC | Age: 63
End: 2023-07-17

## 2023-07-17 VITALS
WEIGHT: 207 LBS | BODY MASS INDEX: 25.74 KG/M2 | HEIGHT: 75 IN | DIASTOLIC BLOOD PRESSURE: 78 MMHG | SYSTOLIC BLOOD PRESSURE: 142 MMHG | HEART RATE: 67 BPM

## 2023-07-17 DIAGNOSIS — K42.9 UMBILICAL HERNIA: Primary | ICD-10-CM

## 2023-07-17 DIAGNOSIS — K42.9 UMBILICAL HERNIA WITHOUT OBSTRUCTION AND WITHOUT GANGRENE: Primary | ICD-10-CM

## 2023-07-17 PROCEDURE — 99242 OFF/OP CONSLTJ NEW/EST SF 20: CPT | Performed by: SURGERY

## 2023-07-17 NOTE — H&P (VIEW-ONLY)
Assessment/Plan: Patient presents with an umbilical hernia. 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 He desires undergo repair. Risks and benefits explained in detail. He is agreeable. Diagnoses and all orders for this visit:    Umbilical hernia without obstruction and without gangrene  -     Ambulatory Referral to General Surgery        Subjective:      Patient ID: Gauri Rojo is a 66 y.o. male. Patient presents for umbilical hernia consult. States he has had a bulge, discomfort and pain with pressure at his umbilicus for 2 years. Limits his activities. The following portions of the patient's history were reviewed and updated as appropriate:     He  has a past medical history of Anxiety (8/11/2022), Hypertension (6/10/2022), and Thyroid nodule (2014). He  has a past surgical history that includes Thyroidectomy, partial and Hernia repair (1964). His family history includes Atrial fibrillation in his mother; Breast cancer in his mother; Coronary artery disease in his father; Dementia in his father; Hearing loss in his mother; Heart disease in his mother; Hypertension in his mother. He  reports that he has never smoked. He has never used smokeless tobacco. He reports current alcohol use of about 10.0 standard drinks of alcohol per week. He reports that he does not use drugs.   Current Outpatient Medications   Medication Sig Dispense Refill   • busPIRone (BUSPAR) 5 mg tablet Take 1 tablet (5 mg total) by mouth 2 (two) times a day as needed (anxiety) 180 tablet 2   • hydrochlorothiazide (HYDRODIURIL) 12.5 mg tablet TAKE 1 TABLET BY MOUTH  DAILY 90 tablet 3   • Melatonin 5 MG TABS Take 5 mg by mouth 1 -2 tabs as needed     • NON FORMULARY 1 tablet daily Calm Aide     • Olive Leaf Extract 150 MG CAPS olive leaf extract     • Omega-3 Fatty Acids (FISH OIL) 1,000 mg Fish Oil     • Turmeric 1053 MG TABS turmeric (bulk)     • valsartan (DIOVAN) 160 mg tablet TAKE 1 TABLET BY MOUTH  DAILY 90 tablet 3     No current facility-administered medications for this visit. He is allergic to no active allergies. .    Review of Systems   Constitutional: Negative. Negative for activity change. HENT: Negative. Eyes: Negative. Respiratory: Negative. Cardiovascular: Negative. Gastrointestinal: Positive for abdominal pain. Endocrine: Negative. Genitourinary: Negative. Musculoskeletal: Negative. Skin: Negative. Allergic/Immunologic: Negative. Neurological: Negative. Psychiatric/Behavioral: Negative for agitation, behavioral problems and confusion. The patient is not nervous/anxious. Objective:      /78   Pulse 67   Ht 6' 2.5" (1.892 m)   Wt 93.9 kg (207 lb)   BMI 26.22 kg/m²          Physical Exam  Constitutional:       Appearance: He is well-developed. He is not diaphoretic. HENT:      Head: Normocephalic and atraumatic. Eyes:      General: No scleral icterus. Right eye: No discharge. Left eye: No discharge. Extraocular Movements: Extraocular movements intact. Conjunctiva/sclera: Conjunctivae normal.   Neck:      Thyroid: No thyromegaly. Trachea: No tracheal deviation. Cardiovascular:      Rate and Rhythm: Normal rate and regular rhythm. Heart sounds: No murmur heard. No friction rub. Pulmonary:      Effort: Pulmonary effort is normal. No respiratory distress. Breath sounds: Normal breath sounds. No stridor. No wheezing. Chest:      Chest wall: No tenderness. Abdominal:      General: There is no distension. Palpations: There is no mass. Tenderness: There is no abdominal tenderness. There is no guarding or rebound. Hernia: A hernia (, Umbilical hernia is present. This is not reducible.) is present. Musculoskeletal:         General: No tenderness. Right lower leg: No edema. Left lower leg: No edema.    Lymphadenopathy:      Cervical: No cervical adenopathy. Skin:     General: Skin is warm and dry. Findings: No erythema or rash. Neurological:      Mental Status: He is alert and oriented to person, place, and time. Cranial Nerves: No cranial nerve deficit.       Coordination: Coordination normal.   Psychiatric:         Behavior: Behavior normal.         Judgment: Judgment normal.

## 2023-07-17 NOTE — PROGRESS NOTES
Assessment/Plan: Patient presents with an umbilical hernia. 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 He desires undergo repair. Risks and benefits explained in detail. He is agreeable. Diagnoses and all orders for this visit:    Umbilical hernia without obstruction and without gangrene  -     Ambulatory Referral to General Surgery        Subjective:      Patient ID: Adwoa Montiel is a 66 y.o. male. Patient presents for umbilical hernia consult. States he has had a bulge, discomfort and pain with pressure at his umbilicus for 2 years. Limits his activities. The following portions of the patient's history were reviewed and updated as appropriate:     He  has a past medical history of Anxiety (8/11/2022), Hypertension (6/10/2022), and Thyroid nodule (2014). He  has a past surgical history that includes Thyroidectomy, partial and Hernia repair (1964). His family history includes Atrial fibrillation in his mother; Breast cancer in his mother; Coronary artery disease in his father; Dementia in his father; Hearing loss in his mother; Heart disease in his mother; Hypertension in his mother. He  reports that he has never smoked. He has never used smokeless tobacco. He reports current alcohol use of about 10.0 standard drinks of alcohol per week. He reports that he does not use drugs.   Current Outpatient Medications   Medication Sig Dispense Refill   • busPIRone (BUSPAR) 5 mg tablet Take 1 tablet (5 mg total) by mouth 2 (two) times a day as needed (anxiety) 180 tablet 2   • hydrochlorothiazide (HYDRODIURIL) 12.5 mg tablet TAKE 1 TABLET BY MOUTH  DAILY 90 tablet 3   • Melatonin 5 MG TABS Take 5 mg by mouth 1 -2 tabs as needed     • NON FORMULARY 1 tablet daily Calm Aide     • Olive Leaf Extract 150 MG CAPS olive leaf extract     • Omega-3 Fatty Acids (FISH OIL) 1,000 mg Fish Oil     • Turmeric 1053 MG TABS turmeric (bulk)     • valsartan (DIOVAN) 160 mg tablet TAKE 1 TABLET BY MOUTH  DAILY 90 tablet 3     No current facility-administered medications for this visit. He is allergic to no active allergies. .    Review of Systems   Constitutional: Negative. Negative for activity change. HENT: Negative. Eyes: Negative. Respiratory: Negative. Cardiovascular: Negative. Gastrointestinal: Positive for abdominal pain. Endocrine: Negative. Genitourinary: Negative. Musculoskeletal: Negative. Skin: Negative. Allergic/Immunologic: Negative. Neurological: Negative. Psychiatric/Behavioral: Negative for agitation, behavioral problems and confusion. The patient is not nervous/anxious. Objective:      /78   Pulse 67   Ht 6' 2.5" (1.892 m)   Wt 93.9 kg (207 lb)   BMI 26.22 kg/m²          Physical Exam  Constitutional:       Appearance: He is well-developed. He is not diaphoretic. HENT:      Head: Normocephalic and atraumatic. Eyes:      General: No scleral icterus. Right eye: No discharge. Left eye: No discharge. Extraocular Movements: Extraocular movements intact. Conjunctiva/sclera: Conjunctivae normal.   Neck:      Thyroid: No thyromegaly. Trachea: No tracheal deviation. Cardiovascular:      Rate and Rhythm: Normal rate and regular rhythm. Heart sounds: No murmur heard. No friction rub. Pulmonary:      Effort: Pulmonary effort is normal. No respiratory distress. Breath sounds: Normal breath sounds. No stridor. No wheezing. Chest:      Chest wall: No tenderness. Abdominal:      General: There is no distension. Palpations: There is no mass. Tenderness: There is no abdominal tenderness. There is no guarding or rebound. Hernia: A hernia (, Umbilical hernia is present. This is not reducible.) is present. Musculoskeletal:         General: No tenderness. Right lower leg: No edema. Left lower leg: No edema.    Lymphadenopathy:      Cervical: No cervical adenopathy. Skin:     General: Skin is warm and dry. Findings: No erythema or rash. Neurological:      Mental Status: He is alert and oriented to person, place, and time. Cranial Nerves: No cranial nerve deficit.       Coordination: Coordination normal.   Psychiatric:         Behavior: Behavior normal.         Judgment: Judgment normal.

## 2023-07-18 ENCOUNTER — TELEPHONE (OUTPATIENT)
Dept: INTERNAL MEDICINE CLINIC | Facility: CLINIC | Age: 63
End: 2023-07-18

## 2023-07-18 NOTE — TELEPHONE ENCOUNTER
Patient is scheduled for surgery on 8/2 for his hernia. Asking if he can have a note for work for light duty. His current job requires heavy lifting.         Please advise

## 2023-07-21 ENCOUNTER — APPOINTMENT (OUTPATIENT)
Dept: LAB | Facility: CLINIC | Age: 63
End: 2023-07-21
Payer: COMMERCIAL

## 2023-07-21 DIAGNOSIS — K42.9 UMBILICAL HERNIA: ICD-10-CM

## 2023-07-21 LAB
ALBUMIN SERPL BCP-MCNC: 4.4 G/DL (ref 3.5–5)
ALP SERPL-CCNC: 63 U/L (ref 34–104)
ALT SERPL W P-5'-P-CCNC: 19 U/L (ref 7–52)
ANION GAP SERPL CALCULATED.3IONS-SCNC: 9 MMOL/L
AST SERPL W P-5'-P-CCNC: 17 U/L (ref 13–39)
BILIRUB SERPL-MCNC: 0.41 MG/DL (ref 0.2–1)
BUN SERPL-MCNC: 18 MG/DL (ref 5–25)
CALCIUM SERPL-MCNC: 9.1 MG/DL (ref 8.4–10.2)
CHLORIDE SERPL-SCNC: 101 MMOL/L (ref 96–108)
CO2 SERPL-SCNC: 28 MMOL/L (ref 21–32)
CREAT SERPL-MCNC: 0.99 MG/DL (ref 0.6–1.3)
ERYTHROCYTE [DISTWIDTH] IN BLOOD BY AUTOMATED COUNT: 12.5 % (ref 11.6–15.1)
GFR SERPL CREATININE-BSD FRML MDRD: 81 ML/MIN/1.73SQ M
GLUCOSE SERPL-MCNC: 84 MG/DL (ref 65–140)
HCT VFR BLD AUTO: 46.4 % (ref 36.5–49.3)
HGB BLD-MCNC: 15.4 G/DL (ref 12–17)
MCH RBC QN AUTO: 30.4 PG (ref 26.8–34.3)
MCHC RBC AUTO-ENTMCNC: 33.2 G/DL (ref 31.4–37.4)
MCV RBC AUTO: 92 FL (ref 82–98)
PLATELET # BLD AUTO: 226 THOUSANDS/UL (ref 149–390)
PMV BLD AUTO: 10.9 FL (ref 8.9–12.7)
POTASSIUM SERPL-SCNC: 3.8 MMOL/L (ref 3.5–5.3)
PROT SERPL-MCNC: 7.1 G/DL (ref 6.4–8.4)
RBC # BLD AUTO: 5.06 MILLION/UL (ref 3.88–5.62)
SODIUM SERPL-SCNC: 138 MMOL/L (ref 135–147)
WBC # BLD AUTO: 9.26 THOUSAND/UL (ref 4.31–10.16)

## 2023-07-21 PROCEDURE — 93005 ELECTROCARDIOGRAM TRACING: CPT

## 2023-07-21 PROCEDURE — 85027 COMPLETE CBC AUTOMATED: CPT

## 2023-07-21 PROCEDURE — 36415 COLL VENOUS BLD VENIPUNCTURE: CPT

## 2023-07-21 PROCEDURE — 80053 COMPREHEN METABOLIC PANEL: CPT

## 2023-07-24 LAB
ATRIAL RATE: 89 BPM
P AXIS: 58 DEGREES
PR INTERVAL: 186 MS
QRS AXIS: 108 DEGREES
QRSD INTERVAL: 108 MS
QT INTERVAL: 388 MS
QTC INTERVAL: 472 MS
T WAVE AXIS: 38 DEGREES
VENTRICULAR RATE: 89 BPM

## 2023-07-24 PROCEDURE — 93010 ELECTROCARDIOGRAM REPORT: CPT | Performed by: INTERNAL MEDICINE

## 2023-07-27 NOTE — PRE-PROCEDURE INSTRUCTIONS
Pre-Surgery Instructions:   Medication Instructions   • busPIRone (BUSPAR) 5 mg tablet Take day of surgery. • hydrochlorothiazide (HYDRODIURIL) 12.5 mg tablet Hold day of surgery. • Melatonin 5 MG TABS Stop taking 7 days prior to surgery. • NON FORMULARY Stop taking 7 days prior to surgery. • Olive Leaf Extract 150 MG CAPS Stop taking 7 days prior to surgery. • Omega-3 Fatty Acids (FISH OIL) 1,000 mg Stop taking 7 days prior to surgery. • Turmeric 1053 MG TABS Stop taking 7 days prior to surgery. • valsartan (DIOVAN) 160 mg tablet Hold day of surgery. Medication instructions for day surgery reviewed. Please use only a sip of water to take your instructed medications. Avoid all over the counter vitamins, supplements and NSAIDS for one week prior to surgery per anesthesia guidelines. Tylenol is ok to take as needed. You will receive a call one business day prior to surgery with an arrival time and hospital directions. If your surgery is scheduled on a Monday, the hospital will be calling you on the Friday prior to your surgery. If you have not heard from anyone by 8pm, please call the hospital supervisor through the hospital  at [phone]. [facility] [phone]). Do not eat or drink anything after midnight the night before your surgery, including candy, mints, lifesavers, or chewing gum. Do not drink alcohol 24hrs before your surgery. Try not to smoke at least 24hrs before your surgery. Follow the pre surgery showering instructions as listed in the [facility] Surgical Experience Booklet” or otherwise provided by your surgeon's office. Do not shave the surgical area 24 hours before surgery. Do not apply any lotions, creams, including makeup, cologne, deodorant, or perfumes after showering on the day of your surgery. No contact lenses, eye make-up, or artificial eyelashes. Remove nail polish, including gel polish, and any artificial, gel, or acrylic nails if possible.  Remove all jewelry including rings and body piercing jewelry. Wear causal clothing that is easy to take on and off. Consider your type of surgery. Keep any valuables, jewelry, piercings at home. Please bring any specially ordered equipment (sling, braces) if indicated. Arrange for a responsible person to drive you to and from the hospital on the day of your surgery. Visitor Guidelines discussed. Call the surgeon's office with any new illnesses, exposures, or additional questions prior to surgery. Please reference your [facility] Surgical Experience Booklet” for additional information to prepare for your upcoming surgery. Pt instructed to stop nsaids and supplements one week prior to surgery. Pt verbalized understanding of shower and med instructions.

## 2023-07-31 ENCOUNTER — TELEPHONE (OUTPATIENT)
Dept: INTERNAL MEDICINE CLINIC | Facility: CLINIC | Age: 63
End: 2023-07-31

## 2023-07-31 DIAGNOSIS — F41.9 ANXIETY: ICD-10-CM

## 2023-07-31 RX ORDER — BUSPIRONE HYDROCHLORIDE 5 MG/1
5 TABLET ORAL 2 TIMES DAILY
Qty: 180 TABLET | Refills: 2 | Status: CANCELLED | OUTPATIENT
Start: 2023-07-31

## 2023-07-31 NOTE — TELEPHONE ENCOUNTER
Pt called requesting a refill (30 day) to [facility]. If his busPIRone (BUSPAR) 5 mg tablet can be sent

## 2023-08-01 ENCOUNTER — ANESTHESIA EVENT (OUTPATIENT)
Dept: PERIOP | Facility: HOSPITAL | Age: 63
End: 2023-08-01
Payer: COMMERCIAL

## 2023-08-01 NOTE — TELEPHONE ENCOUNTER
Spoke to pharmacist at [facility], the previous prescription was sent in for 90 days and does have some remaining refills.  Please disregard this request.

## 2023-08-02 ENCOUNTER — ANESTHESIA (OUTPATIENT)
Dept: PERIOP | Facility: HOSPITAL | Age: 63
End: 2023-08-02
Payer: COMMERCIAL

## 2023-08-02 ENCOUNTER — HOSPITAL ENCOUNTER (OUTPATIENT)
Facility: HOSPITAL | Age: 63
Setting detail: OUTPATIENT SURGERY
Discharge: HOME | End: 2023-08-02
Attending: SURGERY | Admitting: SURGERY
Payer: COMMERCIAL

## 2023-08-02 VITALS
OXYGEN SATURATION: 98 % | SYSTOLIC BLOOD PRESSURE: 136 MMHG | RESPIRATION RATE: 18 BRPM | HEART RATE: 79 BPM | DIASTOLIC BLOOD PRESSURE: 86 MMHG | TEMPERATURE: 97.4 F

## 2023-08-02 DIAGNOSIS — K42.9 UMBILICAL HERNIA WITHOUT OBSTRUCTION AND WITHOUT GANGRENE: Primary | ICD-10-CM

## 2023-08-02 PROCEDURE — C1781 MESH (IMPLANTABLE): HCPCS | Performed by: SURGERY

## 2023-08-02 PROCEDURE — 49591 RPR AA HRN 1ST < 3 CM RDC: CPT | Performed by: SURGERY

## 2023-08-02 DEVICE — VENTRALEX ST HERNIA PATCH
Type: IMPLANTABLE DEVICE | Site: UMBILICAL | Status: FUNCTIONAL
Brand: VENTRALEX ST HERNIA PATCH

## 2023-08-02 RX ORDER — ACETAMINOPHEN 325 MG/1
650 TABLET ORAL EVERY 4 HOURS PRN
Status: DISCONTINUED | OUTPATIENT
Start: 2023-08-02 | End: 2023-08-02 | Stop reason: HOSPADM

## 2023-08-02 RX ORDER — PROPOFOL 10 MG/ML
INJECTION, EMULSION INTRAVENOUS AS NEEDED
Status: DISCONTINUED | OUTPATIENT
Start: 2023-08-02 | End: 2023-08-02

## 2023-08-02 RX ORDER — CEFAZOLIN SODIUM 2 G/50ML
SOLUTION INTRAVENOUS AS NEEDED
Status: DISCONTINUED | OUTPATIENT
Start: 2023-08-02 | End: 2023-08-02

## 2023-08-02 RX ORDER — ONDANSETRON 2 MG/ML
4 INJECTION INTRAMUSCULAR; INTRAVENOUS EVERY 4 HOURS PRN
Status: DISCONTINUED | OUTPATIENT
Start: 2023-08-02 | End: 2023-08-02 | Stop reason: HOSPADM

## 2023-08-02 RX ORDER — KETOROLAC TROMETHAMINE 30 MG/ML
INJECTION, SOLUTION INTRAMUSCULAR; INTRAVENOUS AS NEEDED
Status: DISCONTINUED | OUTPATIENT
Start: 2023-08-02 | End: 2023-08-02

## 2023-08-02 RX ORDER — BUPIVACAINE HYDROCHLORIDE 2.5 MG/ML
INJECTION, SOLUTION EPIDURAL; INFILTRATION; INTRACAUDAL AS NEEDED
Status: DISCONTINUED | OUTPATIENT
Start: 2023-08-02 | End: 2023-08-02 | Stop reason: HOSPADM

## 2023-08-02 RX ORDER — GLYCOPYRROLATE 0.2 MG/ML
INJECTION INTRAMUSCULAR; INTRAVENOUS AS NEEDED
Status: DISCONTINUED | OUTPATIENT
Start: 2023-08-02 | End: 2023-08-02

## 2023-08-02 RX ORDER — ONDANSETRON 2 MG/ML
INJECTION INTRAMUSCULAR; INTRAVENOUS AS NEEDED
Status: DISCONTINUED | OUTPATIENT
Start: 2023-08-02 | End: 2023-08-02

## 2023-08-02 RX ORDER — HYDROMORPHONE HCL/PF 1 MG/ML
0.5 SYRINGE (ML) INJECTION
Status: DISCONTINUED | OUTPATIENT
Start: 2023-08-02 | End: 2023-08-02 | Stop reason: HOSPADM

## 2023-08-02 RX ORDER — FENTANYL CITRATE 50 UG/ML
INJECTION, SOLUTION INTRAMUSCULAR; INTRAVENOUS AS NEEDED
Status: DISCONTINUED | OUTPATIENT
Start: 2023-08-02 | End: 2023-08-02

## 2023-08-02 RX ORDER — SODIUM CHLORIDE, SODIUM LACTATE, POTASSIUM CHLORIDE, CALCIUM CHLORIDE 600; 310; 30; 20 MG/100ML; MG/100ML; MG/100ML; MG/100ML
INJECTION, SOLUTION INTRAVENOUS CONTINUOUS PRN
Status: DISCONTINUED | OUTPATIENT
Start: 2023-08-02 | End: 2023-08-02

## 2023-08-02 RX ORDER — MAGNESIUM HYDROXIDE 1200 MG/15ML
LIQUID ORAL AS NEEDED
Status: DISCONTINUED | OUTPATIENT
Start: 2023-08-02 | End: 2023-08-02 | Stop reason: HOSPADM

## 2023-08-02 RX ORDER — LIDOCAINE HYDROCHLORIDE 10 MG/ML
INJECTION, SOLUTION EPIDURAL; INFILTRATION; INTRACAUDAL; PERINEURAL AS NEEDED
Status: DISCONTINUED | OUTPATIENT
Start: 2023-08-02 | End: 2023-08-02

## 2023-08-02 RX ORDER — ONDANSETRON 2 MG/ML
4 INJECTION INTRAMUSCULAR; INTRAVENOUS ONCE AS NEEDED
Status: DISCONTINUED | OUTPATIENT
Start: 2023-08-02 | End: 2023-08-02 | Stop reason: HOSPADM

## 2023-08-02 RX ORDER — FENTANYL CITRATE/PF 50 MCG/ML
50 SYRINGE (ML) INJECTION
Status: DISCONTINUED | OUTPATIENT
Start: 2023-08-02 | End: 2023-08-02 | Stop reason: HOSPADM

## 2023-08-02 RX ORDER — DEXAMETHASONE SODIUM PHOSPHATE 10 MG/ML
INJECTION, SOLUTION INTRAMUSCULAR; INTRAVENOUS AS NEEDED
Status: DISCONTINUED | OUTPATIENT
Start: 2023-08-02 | End: 2023-08-02

## 2023-08-02 RX ORDER — OXYCODONE HYDROCHLORIDE AND ACETAMINOPHEN 5; 325 MG/1; MG/1
1 TABLET ORAL EVERY 4 HOURS PRN
Qty: 10 TABLET | Refills: 0 | Status: SHIPPED | OUTPATIENT
Start: 2023-08-02

## 2023-08-02 RX ORDER — OXYCODONE HYDROCHLORIDE AND ACETAMINOPHEN 5; 325 MG/1; MG/1
1 TABLET ORAL EVERY 4 HOURS PRN
Status: DISCONTINUED | OUTPATIENT
Start: 2023-08-02 | End: 2023-08-02 | Stop reason: HOSPADM

## 2023-08-02 RX ORDER — MIDAZOLAM HYDROCHLORIDE 2 MG/2ML
INJECTION, SOLUTION INTRAMUSCULAR; INTRAVENOUS AS NEEDED
Status: DISCONTINUED | OUTPATIENT
Start: 2023-08-02 | End: 2023-08-02

## 2023-08-02 RX ADMIN — FENTANYL CITRATE 25 MCG: 50 INJECTION INTRAMUSCULAR; INTRAVENOUS at 07:50

## 2023-08-02 RX ADMIN — DEXAMETHASONE SODIUM PHOSPHATE 10 MG: 10 INJECTION, SOLUTION INTRAMUSCULAR; INTRAVENOUS at 07:35

## 2023-08-02 RX ADMIN — CEFAZOLIN SODIUM 2000 MG: 2 SOLUTION INTRAVENOUS at 07:27

## 2023-08-02 RX ADMIN — GLYCOPYRROLATE 0.2 MG: 0.2 INJECTION, SOLUTION INTRAMUSCULAR; INTRAVENOUS at 07:30

## 2023-08-02 RX ADMIN — MIDAZOLAM HYDROCHLORIDE 2 MG: 1 INJECTION, SOLUTION INTRAMUSCULAR; INTRAVENOUS at 07:27

## 2023-08-02 RX ADMIN — SODIUM CHLORIDE, SODIUM LACTATE, POTASSIUM CHLORIDE, AND CALCIUM CHLORIDE: .6; .31; .03; .02 INJECTION, SOLUTION INTRAVENOUS at 07:20

## 2023-08-02 RX ADMIN — LIDOCAINE HYDROCHLORIDE 50 MG: 10 INJECTION, SOLUTION EPIDURAL; INFILTRATION; INTRACAUDAL; PERINEURAL at 07:33

## 2023-08-02 RX ADMIN — FENTANYL CITRATE 25 MCG: 50 INJECTION INTRAMUSCULAR; INTRAVENOUS at 07:39

## 2023-08-02 RX ADMIN — KETOROLAC TROMETHAMINE 15 MG: 30 INJECTION, SOLUTION INTRAMUSCULAR at 08:04

## 2023-08-02 RX ADMIN — FENTANYL CITRATE 50 MCG: 50 INJECTION INTRAMUSCULAR; INTRAVENOUS at 07:54

## 2023-08-02 RX ADMIN — ONDANSETRON 4 MG: 2 INJECTION INTRAMUSCULAR; INTRAVENOUS at 07:27

## 2023-08-02 RX ADMIN — PROPOFOL 200 MG: 10 INJECTION, EMULSION INTRAVENOUS at 07:33

## 2023-08-02 NOTE — INTERVAL H&P NOTE
H&P reviewed. After examining the patient I find no changes in the patients condition since the H&P had been written.     Vitals:    08/02/23 0649   BP: (!) 184/96   Pulse: 68   Resp: 20   Temp: (!) 97 °F (36.1 °C)   SpO2: 99%

## 2023-08-02 NOTE — ANESTHESIA PREPROCEDURE EVALUATION
Procedure:  REPAIR HERNIA UMBILICAL (Abdomen)    Relevant Problems   CARDIO   (+) Hyperlipidemia   (+) Hypertension      /RENAL   (+) Benign prostatic hyperplasia      MUSCULOSKELETAL   (+) Strain of right quadriceps      NEURO/PSYCH   (+) Anxiety        Physical Exam    Airway    Mallampati score: III  TM Distance: >3 FB  Neck ROM: full     Dental   No notable dental hx     Cardiovascular  Cardiovascular exam normal    Pulmonary  Pulmonary exam normal     Other Findings     in pre op. Asymptomatic    Anesthesia Plan  ASA Score- 2     Anesthesia Type- general with ASA Monitors. Additional Monitors:   Airway Plan: LMA. Plan Factors-Exercise tolerance (METS): >4 METS. Chart reviewed. EKG reviewed. Existing labs reviewed. Patient summary reviewed. Patient is not a current smoker. Induction- intravenous. Postoperative Plan- Plan for postoperative opioid use. Informed Consent- Anesthetic plan and risks discussed with patient. I personally reviewed this patient with the CRNA. Discussed and agreed on the Anesthesia Plan with the CRNA. Jesus Manuel Walker

## 2023-08-02 NOTE — OP NOTE
OPERATIVE REPORT  PATIENT NAME: Nichola Gitelman    :  1960  MRN: [id number]  Pt Location: [facility] OR ROOM 01    SURGERY DATE: 2023    Surgeon(s) and Role:     * Sriram Workman MD - Primary     * Cindy Grigsby MD - Assisting    Preop Diagnosis:  Umbilical hernia [G98.1]    Post-Op Diagnosis Codes:     * Umbilical hernia [Y36.8]    Procedure(s):  REPAIR HERNIA UMBILICAL with mesh    Specimen(s):  * No specimens in log *    Estimated Blood Loss:   5 mL    Drains:  * No LDAs found *    Anesthesia Type:   General    Operative Indications:  Umbilical hernia [R34.2]    Independent, non-smoker, ASA 2, wound class I, BMI 26, weight 207, height 73  (+) Hyperlipidemia   (+) Hypertension       /RENAL   (+) Benign prostatic hyperplasia       MUSCULOSKELETAL   (+) Strain of right quadriceps       NEURO/PSYCH   (+) Anxiety                       Complications:   None    Procedure and Technique:  Patient was identified visually and by armband. Placed in supine position. After anesthesia the abdomen was prepped and draped in a sterile fashion. Quarter percent Marcaine with epinephrine was utilized. Incision was made and carried down through skin subtenons tissue. Hernia sac was dissected free and amputated. A preperitoneal mesh was then placed. This was sewn in place interrupted figure-of-eight #1 Vicryl suture. This was followed by 3-0 Vicryl subcutaneous and 4-0 Monocryl sutures. Dermabond was applied. The patient tolerated this procedure well. Sponge and instrument count correct x2. The hernia size was 2.5 cm. I was present for the entire procedure.     Patient Disposition:  PACU         SIGNATURE: Sriram Workman MD  DATE: 2023  TIME: 8:22 AM

## 2023-08-02 NOTE — DISCHARGE INSTR - AVS FIRST PAGE
Please call the office when you leave to schedule an appointment for 2 weeks. Please call [phone]. [address], [address], [place], [address]. Off of [address] between [facility] and [facility]. Activity:    May lift 10 lb as many times as desired the 1st week,       20 lb in 2 weeks,       30 lb in 3 weeks. Walking is encouraged  Normal daily activities including climbing steps are okay  Do not engage in strenuous activity ( sit-ups or crutches) or contact sports for 4-6 weeks post-operatively    Return to Work:   Okay to return to work when you feel well if you desire. Diet:   You may return to your normal healthy diet. Wound Care: Your wound is closed with dissolvable stitches and glue. It is okay to shower. Wash incision gently with soap and water and pat dry. Do not soak incisions in bath water or swim for two weeks. Do not apply any creams or ointments. Pain Medication:   Please take as directed if needed. May use Advil or Motrin in addition. Recall, the pain medicine and anesthesia is associated with constipation. No driving while taking narcotic pain medications. Other: It is normal to developed a “healing ridge” / firm incision after surgery. This is your body making scar tissue. It is a good sign  Constipation is very common after general anesthesia. Please use milk of magnesia as needed in order to help prevent constipation. It is normal to get bruising after surgery. If you have questions after discharge please call the office.     If you have increased pain, fever >101.5, increased drainage, redness or a bad smell at your surgery site, please call us immediately or come directly to the Emergency Room

## 2023-08-02 NOTE — ANESTHESIA POSTPROCEDURE EVALUATION
Post-Op Assessment Note    CV Status:  Stable  Pain Score: 0    Pain management: adequate     Mental Status:  Alert and awake   Hydration Status:  Euvolemic   PONV Controlled:  Controlled   Airway Patency:  Patent      Post Op Vitals Reviewed: Yes      Staff: CRNA         No notable events documented.     /72 (08/02/23 0830)    Temp 97.6 °F (36.4 °C) (08/02/23 0827)    Pulse 72 (08/02/23 0830)   Resp 16 (08/02/23 0830)    SpO2 99 % (08/02/23 0830)

## 2023-08-07 DIAGNOSIS — F41.9 ANXIETY: ICD-10-CM

## 2023-08-07 RX ORDER — BUSPIRONE HYDROCHLORIDE 5 MG/1
5 TABLET ORAL 2 TIMES DAILY PRN
Qty: 180 TABLET | Refills: 2 | Status: SHIPPED | OUTPATIENT
Start: 2023-08-07

## 2023-08-08 ENCOUNTER — SOCIAL WORK (OUTPATIENT)
Dept: BEHAVIORAL/MENTAL HEALTH CLINIC | Facility: CLINIC | Age: 63
End: 2023-08-08
Payer: COMMERCIAL

## 2023-08-08 DIAGNOSIS — F41.9 ANXIETY: Primary | ICD-10-CM

## 2023-08-08 PROCEDURE — 90834 PSYTX W PT 45 MINUTES: CPT | Performed by: SOCIAL WORKER
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
## 2023-08-15 ENCOUNTER — OFFICE VISIT (OUTPATIENT)
Dept: SURGERY | Facility: CLINIC | Age: 63
End: 2023-08-15
Payer: COMMERCIAL

## 2023-08-15 DIAGNOSIS — K42.9 UMBILICAL HERNIA WITHOUT OBSTRUCTION AND WITHOUT GANGRENE: Primary | ICD-10-CM

## 2023-08-15 PROCEDURE — 99910: CPT | Performed by: SURGERY

## 2023-08-15 PROCEDURE — 99212 OFFICE O/P EST SF 10 MIN: CPT | Performed by: SURGERY

## 2023-08-15 NOTE — PROGRESS NOTES
Assessment/Plan: Patient is status postumbilical hernia repair. He feels well. He offers no complaints. He is advance his activities nicely. Incisions clean healing well. All questions answered. There are no diagnoses linked to this encounter. Pathology: None sent      Postoperative restrictions reviewed. All questions answered. ______________________________________________________  HPI: Umbilical hernia repair 4/7/9346, presents for post op exam.  Doing well. ROS:  General ROS: negative for - chills, fatigue, fever or night sweats, weight loss  Respiratory ROS: no cough, shortness of breath, or wheezing  Cardiovascular ROS: no chest pain or dyspnea on exertion  Genito-Urinary ROS: no dysuria, trouble voiding, or hematuria  Musculoskeletal ROS: negative for - gait disturbance, joint pain or muscle pain  Neurological ROS: no TIA or stroke symptoms  GI ROS: see HPI  Skin ROS: no new rashes or lesions   Lymphatic ROS: no new adenopathy noted by pt. GYN ROS: see HPI, no new GYN history or bleeding noted  Psy ROS: no new mental or behavioral disturbances         Patient Active Problem List   Diagnosis   • Irritable bowel syndrome with diarrhea   • Diverticulosis   • Hypertension   • Overweight (BMI 25.0-29. 9)   • Prediabetes   • Need for Tdap vaccination   • Need for shingles vaccine   • Strain of right quadriceps   • Pre-hypertension   • Thyroid disorder screen   • Hyperlipidemia   • Encounter for hepatitis C screening test for low risk patient   • Elevated fasting blood sugar   • Wellness examination   • Anxiety   • Increased prostate specific antigen (PSA) velocity   • Benign prostatic hyperplasia   • Umbilical hernia without obstruction and without gangrene       Allergies:  No active allergies      Current Outpatient Medications:   •  busPIRone (BUSPAR) 5 mg tablet, Take 1 tablet (5 mg total) by mouth 2 (two) times a day as needed (anxiety), Disp: 180 tablet, Rfl: 2  • hydrochlorothiazide (HYDRODIURIL) 12.5 mg tablet, TAKE 1 TABLET BY MOUTH  DAILY, Disp: 90 tablet, Rfl: 3  •  Melatonin 5 MG TABS, Take 5 mg by mouth 1 -2 tabs as needed, Disp: , Rfl:   •  NON FORMULARY, 1 tablet daily Calm Aide, Disp: , Rfl:   •  Olive Leaf Extract 150 MG CAPS, in the morning, Disp: , Rfl:   •  Omega-3 Fatty Acids (FISH OIL) 1,000 mg, daily, Disp: , Rfl:   •  oxyCODONE-acetaminophen (PERCOCET) 5-325 mg per tablet, Take 1 tablet by mouth every 4 (four) hours as needed for moderate pain for up to 10 doses Max Daily Amount: 6 tablets, Disp: 10 tablet, Rfl: 0  •  Turmeric 1053 MG TABS, in the morning, Disp: , Rfl:   •  valsartan (DIOVAN) 160 mg tablet, TAKE 1 TABLET BY MOUTH  DAILY, Disp: 90 tablet, Rfl: 3    Past Medical History:   Diagnosis Date   • Anxiety 8/11/2022    unsure if this my condition   • Hypertension 6/10/2022    AT [facility] exam . said it was elevated   • Thyroid nodule 2014    Not cancerous       Past Surgical History:   Procedure Laterality Date   • HERNIA REPAIR  1964    Toddler double hernia   • [place] RPR AA HERNIA 1ST < 3 CM REDUCIBLE N/A 8/2/2023    Procedure: REPAIR HERNIA UMBILICAL;  Surgeon: Cindy Lunsford MD;  Location: [facility];  Service: General   • THYROIDECTOMY, PARTIAL         Family History   Problem Relation Age of Onset   • Breast cancer Mother    • Hypertension Mother    • Atrial fibrillation Mother    • Heart disease Mother         Cog. Heart failure   • Hearing loss Mother    • Coronary artery disease Father    • Dementia Father         some mental issues        reports that he has never smoked. He has never used smokeless tobacco. He reports current alcohol use of about 8.0 standard drinks of alcohol per week. He reports that he does not use drugs. PHYSICAL EXAM    There were no vitals taken for this visit.     General: normal, cooperative, no distress  Abdominal: soft, nondistended or nontender  Incision: clean, dry, and intact and healing well      Emily Brookmarai del carmen Satish Mccoy MD    Date: 8/15/2023 Time: 9:35 AM

## 2023-10-06 RX ORDER — KETOCONAZOLE 20 MG/ML
SHAMPOO TOPICAL
COMMUNITY
Start: 2023-07-23

## 2023-10-11 ENCOUNTER — OFFICE VISIT (OUTPATIENT)
Dept: INTERNAL MEDICINE CLINIC | Facility: CLINIC | Age: 63
End: 2023-10-11
Payer: COMMERCIAL

## 2023-10-11 VITALS
SYSTOLIC BLOOD PRESSURE: 140 MMHG | TEMPERATURE: 98.3 F | DIASTOLIC BLOOD PRESSURE: 70 MMHG | HEIGHT: 75 IN | OXYGEN SATURATION: 95 % | HEART RATE: 83 BPM | BODY MASS INDEX: 26.19 KG/M2 | WEIGHT: 210.6 LBS

## 2023-10-11 DIAGNOSIS — K42.9 UMBILICAL HERNIA WITHOUT OBSTRUCTION AND WITHOUT GANGRENE: ICD-10-CM

## 2023-10-11 DIAGNOSIS — Z12.5 SCREENING PSA (PROSTATE SPECIFIC ANTIGEN): ICD-10-CM

## 2023-10-11 DIAGNOSIS — I15.9 SECONDARY HYPERTENSION: ICD-10-CM

## 2023-10-11 DIAGNOSIS — E66.3 OVERWEIGHT (BMI 25.0-29.9): ICD-10-CM

## 2023-10-11 DIAGNOSIS — F41.9 ANXIETY: ICD-10-CM

## 2023-10-11 DIAGNOSIS — Z13.6 SCREENING FOR CARDIOVASCULAR CONDITION: ICD-10-CM

## 2023-10-11 DIAGNOSIS — R73.03 PREDIABETES: Primary | ICD-10-CM

## 2023-10-11 DIAGNOSIS — E78.5 HYPERLIPIDEMIA, UNSPECIFIED HYPERLIPIDEMIA TYPE: ICD-10-CM

## 2023-10-11 PROCEDURE — 99214 OFFICE O/P EST MOD 30 MIN: CPT | Performed by: INTERNAL MEDICINE

## 2023-10-11 NOTE — PROGRESS NOTES
Assessment/Plan:    Prediabetes  Pre diabetes -I have counseled the patient to follow a healthy balanced diet, I have counseled patient reduce carbohydrates and sweets in the diet, I would like the patient exercise routinely. I will be checking hemoglobin A1c and comprehensive metabolic panel. Have counseled patient about the prevention of diabetes, and the risk of progression to type 2 diabetes. Umbilical hernia without obstruction and without gangrene  Status post hernia repair incisions are healing well reviewed general surgery report    Overweight (BMI 25.0-29. 9)  I have counselled the pt to follow a healthy and balanced diet ,and recommend routine exercise. Hypertension  Hypertension - controlled, I have counseled patient following healthy balance diet, I would like the patient reduce sodium, exercise routinely, I would like the patient continued the med current medical regiment and we will continue to monitor. Continue Diovan 160 mg once daily, hydrochlorothiazide 12.5 mg once daily    Hyperlipidemia  Low-cholesterol diet, check lipid profile    Anxiety  Clinically stable and doing well continue the current medical regiment will continue monitor. Patient reports me his anxiety levels have improved he is doing well with the BuSpar we will continue BuSpar 5 mg twice daily we will rediscuss in 6 months to see if he continues to need this medication         Problem List Items Addressed This Visit        Cardiovascular and Mediastinum    Hypertension     Hypertension - controlled, I have counseled patient following healthy balance diet, I would like the patient reduce sodium, exercise routinely, I would like the patient continued the med current medical regiment and we will continue to monitor. Continue Diovan 160 mg once daily, hydrochlorothiazide 12.5 mg once daily            Other    Overweight (BMI 25.0-29. 9)     I have counselled the pt to follow a healthy and balanced diet ,and recommend routine exercise. Prediabetes - Primary     Pre diabetes -I have counseled the patient to follow a healthy balanced diet, I have counseled patient reduce carbohydrates and sweets in the diet, I would like the patient exercise routinely. I will be checking hemoglobin A1c and comprehensive metabolic panel. Have counseled patient about the prevention of diabetes, and the risk of progression to type 2 diabetes. Relevant Orders    Comprehensive metabolic panel    Hemoglobin A1C    Hyperlipidemia     Low-cholesterol diet, check lipid profile         Anxiety     Clinically stable and doing well continue the current medical regiment will continue monitor. Patient reports me his anxiety levels have improved he is doing well with the BuSpar we will continue BuSpar 5 mg twice daily we will rediscuss in 6 months to see if he continues to need this medication         Umbilical hernia without obstruction and without gangrene     Status post hernia repair incisions are healing well reviewed general surgery report        Other Visit Diagnoses     Screening PSA (prostate specific antigen)        Relevant Orders    PSA, Total Screen    Screening for cardiovascular condition        Relevant Orders    Lipid Panel with Direct LDL reflex      RTO in 6 months call if any problems        Subjective:      Patient ID: Giancarlo Lindsay is a 66 y.o. male. HPI 62-year old male coming in for a follow up visit regarding prediabetes, hypertension, status post umbilical hernia repair, hyperlipidemia, anxiety/generalized anxiety disorder and overweight; the patient reports me compliant taking medications without untoward side effects the. The patient is here to review his medical condition, update me on the medical condition and the patient reports me no hospitalizations and no ER visits. Reports improvements in the anxiety anxiety doing better with the buspar ,  had the umbilical heria Dr Frankey Mallard go the flushot at work,  healthy diet. Overall the patient is doing very well no injuries no illnesses. Patient recently has had hernia repair with general surgeon MARYJANE Shearer Meth he reports me incision is healed he is doing much better no complications. The following portions of the patient's history were reviewed and updated as appropriate: allergies, current medications, past family history, past medical history, past social history, past surgical history and problem list.    Review of Systems   Constitutional:  Negative for activity change, appetite change and unexpected weight change. HENT:  Negative for congestion and postnasal drip. Eyes:  Negative for visual disturbance. Respiratory:  Negative for cough and shortness of breath. Cardiovascular:  Negative for chest pain. Gastrointestinal:  Negative for abdominal pain, diarrhea, nausea and vomiting. Abdominal incision healed   Neurological:  Negative for dizziness, light-headedness and headaches. Psychiatric/Behavioral:  Negative for suicidal ideas. The patient is not nervous/anxious. Anxiety improved         Objective:    No follow-ups on file. No results found.       Allergies   Allergen Reactions   • No Active Allergies        Past Medical History:   Diagnosis Date   • Anxiety 8/11/2022    unsure if this my condition   • Hypertension 6/10/2022    AT [facility] exam . said it was elevated   • Thyroid nodule 2014    Not cancerous     Past Surgical History:   Procedure Laterality Date   • HERNIA REPAIR  1964    Toddler double hernia   • ID RPR AA HERNIA 1ST < 3 CM REDUCIBLE N/A 8/2/2023    Procedure: REPAIR HERNIA UMBILICAL;  Surgeon: Monica Pereyra MD;  [facility];  Service: General   • THYROIDECTOMY, PARTIAL       Current Outpatient Medications on File Prior to Visit   Medication Sig Dispense Refill   • busPIRone (BUSPAR) 5 mg tablet Take 1 tablet (5 mg total) by mouth 2 (two) times a day as needed (anxiety) 180 tablet 2   • hydrochlorothiazide (HYDRODIURIL) 12.5 mg tablet TAKE 1 TABLET BY MOUTH  DAILY 90 tablet 3   • ketoconazole (NIZORAL) 2 % shampoo SHAMPOO SCALP 3 TIMES PER WEEK, KEEP ON 5 MINUTES THEN WASH OFF, THEN AS NEEDED. • Melatonin 5 MG TABS Take 5 mg by mouth 1 -2 tabs as needed     • NON FORMULARY 1 tablet daily Calm Aide     • Omega-3 Fatty Acids (FISH OIL) 1,000 mg daily     • Turmeric 1053 MG TABS in the morning     • valsartan (DIOVAN) 160 mg tablet TAKE 1 TABLET BY MOUTH  DAILY 90 tablet 3   • [DISCONTINUED] Olive Leaf Extract 150 MG CAPS in the morning     • [DISCONTINUED] oxyCODONE-acetaminophen (PERCOCET) 5-325 mg per tablet Take 1 tablet by mouth every 4 (four) hours as needed for moderate pain for up to 10 doses Max Daily Amount: 6 tablets 10 tablet 0     No current facility-administered medications on file prior to visit. Family History   Problem Relation Age of Onset   • Breast cancer Mother    • Hypertension Mother    • Atrial fibrillation Mother    • Heart disease Mother         Cog. Heart failure   • Hearing loss Mother    • Coronary artery disease Father    • Dementia Father         some mental issues     Social History     Socioeconomic History   • Marital status: /Civil Union     Spouse name: Not on file   • Number of children: Not on file   • Years of education: Not on file   • Highest education level: Not on file   Occupational History   • Not on file   Tobacco Use   • Smoking status: Never   • Smokeless tobacco: Never   Vaping Use   • Vaping Use: Never used   Substance and Sexual Activity   • Alcohol use:  Yes     Alcohol/week: 8.0 standard drinks of alcohol     Types: 8 Cans of beer per week   • Drug use: Never   • Sexual activity: Yes     Partners: Female   Other Topics Concern   • Not on file   Social History Narrative   • Not on file     Social Determinants of Health     Financial Resource Strain: Not on file   Food Insecurity: Not on file   Transportation Needs: Not on file   Physical Activity: Not on file   Stress: Not on file   Social Connections: Not on file   Intimate Partner Violence: Not on file   Housing Stability: Not on file     Vitals:    10/11/23 1714   BP: 140/70   BP Location: Left arm   Patient Position: Sitting   Cuff Size: Large   Pulse: 83   Temp: 98.3 °F (36.8 °C)   TempSrc: Probe   SpO2: 95%   Weight: 95.5 kg (210 lb 9.6 oz)   Height: 6' 2.5" (1.892 m)     Results for orders placed or performed in visit on 07/21/23   CBC and Platelet   Result Value Ref Range    WBC 9.26 4.31 - 10.16 Thousand/uL    RBC 5.06 3.88 - 5.62 Million/uL    Hemoglobin 15.4 12.0 - 17.0 g/dL    Hematocrit 46.4 36.5 - 49.3 %    MCV 92 82 - 98 fL    MCH 30.4 26.8 - 34.3 pg    MCHC 33.2 31.4 - 37.4 g/dL    RDW 12.5 11.6 - 15.1 %    Platelets 360 462 - 255 Thousands/uL    MPV 10.9 8.9 - 12.7 fL   Comprehensive metabolic panel   Result Value Ref Range    Sodium 138 135 - 147 mmol/L    Potassium 3.8 3.5 - 5.3 mmol/L    Chloride 101 96 - 108 mmol/L    CO2 28 21 - 32 mmol/L    ANION GAP 9 mmol/L    BUN 18 5 - 25 mg/dL    Creatinine 0.99 0.60 - 1.30 mg/dL    Glucose 84 65 - 140 mg/dL    Calcium 9.1 8.4 - 10.2 mg/dL    AST 17 13 - 39 U/L    ALT 19 7 - 52 U/L    Alkaline Phosphatase 63 34 - 104 U/L    Total Protein 7.1 6.4 - 8.4 g/dL    Albumin 4.4 3.5 - 5.0 g/dL    Total Bilirubin 0.41 0.20 - 1.00 mg/dL    eGFR 81 ml/min/1.73sq m     Weight (last 2 days)     None        Body mass index is 26.68 kg/m². BP      Temp      Pulse     Resp      SpO2        Vitals:    10/11/23 1714   Weight: 95.5 kg (210 lb 9.6 oz)     Vitals:    10/11/23 1714   Weight: 95.5 kg (210 lb 9.6 oz)       /70 (BP Location: Left arm, Patient Position: Sitting, Cuff Size: Large)   Pulse 83   Temp 98.3 °F (36.8 °C) (Probe)   Ht 6' 2.5" (1.892 m)   Wt 95.5 kg (210 lb 9.6 oz)   SpO2 95%   BMI 26.68 kg/m²          Physical Exam  Vitals and nursing note reviewed. Constitutional:       General: He is not in acute distress.      Appearance: Normal appearance. He is well-developed. He is not ill-appearing, toxic-appearing or diaphoretic. HENT:      Head: Normocephalic and atraumatic. Right Ear: External ear normal.      Left Ear: External ear normal.   Eyes:      General: No scleral icterus. Right eye: No discharge. Left eye: No discharge. Conjunctiva/sclera: Conjunctivae normal.      Pupils: Pupils are equal, round, and reactive to light. Cardiovascular:      Rate and Rhythm: Normal rate and regular rhythm. Heart sounds: Normal heart sounds. No murmur heard. No friction rub. No gallop. Pulmonary:      Effort: No respiratory distress. Breath sounds: No wheezing or rales. Abdominal:      General: Bowel sounds are normal. There is no distension. Palpations: Abdomen is soft. There is no mass. Tenderness: There is no abdominal tenderness. There is no guarding or rebound. Musculoskeletal:         General: No deformity. Cervical back: Neck supple. Lymphadenopathy:      Cervical: No cervical adenopathy. Neurological:      Mental Status: He is alert. Psychiatric:         Mood and Affect: Mood is anxious. Mood is not depressed. Thought Content: Thought content does not include suicidal ideation.       Comments: Appears more calm at today's visit

## 2023-10-15 NOTE — ASSESSMENT & PLAN NOTE
Hypertension - controlled, I have counseled patient following healthy balance diet, I would like the patient reduce sodium, exercise routinely, I would like the patient continued the med current medical regiment and we will continue to monitor.   Continue Diovan 160 mg once daily, hydrochlorothiazide 12.5 mg once daily

## 2023-10-15 NOTE — ASSESSMENT & PLAN NOTE
Clinically stable and doing well continue the current medical regiment will continue monitor.   Patient reports me his anxiety levels have improved he is doing well with the BuSpar we will continue BuSpar 5 mg twice daily we will rediscuss in 6 months to see if he continues to need this medication

## 2023-11-24 DIAGNOSIS — I10 ESSENTIAL HYPERTENSION: ICD-10-CM

## 2023-11-27 RX ORDER — HYDROCHLOROTHIAZIDE 12.5 MG/1
TABLET ORAL
Qty: 90 TABLET | Refills: 1 | Status: SHIPPED | OUTPATIENT
Start: 2023-11-27

## 2023-11-27 RX ORDER — VALSARTAN 160 MG/1
TABLET ORAL
Qty: 90 TABLET | Refills: 1 | Status: SHIPPED | OUTPATIENT
Start: 2023-11-27

## 2024-01-25 ENCOUNTER — TELEPHONE (OUTPATIENT)
Dept: UROLOGY | Facility: AMBULATORY SURGERY CENTER | Age: 64
End: 2024-01-25

## 2024-01-25 NOTE — TELEPHONE ENCOUNTER
Pt has been placed on the wait list and scheduled as follows:    Date: 7/24/2024     Arrival Time: 3:25PM     Visit Type: FOLLOW UP PG [[id number]]     Provider: Rajiv Stock PA-C Department: [facility] Area:  Department Phone: [phone]

## 2024-02-21 PROBLEM — Z13.29 THYROID DISORDER SCREEN: Status: RESOLVED | Noted: 2020-10-06 | Resolved: 2024-02-21

## 2024-04-03 LAB — HBA1C MFR BLD HPLC: 5.6 %

## 2024-04-17 ENCOUNTER — OFFICE VISIT (OUTPATIENT)
Dept: INTERNAL MEDICINE CLINIC | Facility: CLINIC | Age: 64
End: 2024-04-17
Payer: COMMERCIAL

## 2024-04-17 VITALS
RESPIRATION RATE: 16 BRPM | HEIGHT: 74 IN | TEMPERATURE: 98.9 F | DIASTOLIC BLOOD PRESSURE: 86 MMHG | HEART RATE: 73 BPM | BODY MASS INDEX: 27.28 KG/M2 | SYSTOLIC BLOOD PRESSURE: 130 MMHG | OXYGEN SATURATION: 97 % | WEIGHT: 212.6 LBS

## 2024-04-17 DIAGNOSIS — F41.9 ANXIETY: ICD-10-CM

## 2024-04-17 DIAGNOSIS — Z12.11 SCREENING FOR COLON CANCER: Primary | ICD-10-CM

## 2024-04-17 DIAGNOSIS — Z12.5 SCREENING PSA (PROSTATE SPECIFIC ANTIGEN): ICD-10-CM

## 2024-04-17 DIAGNOSIS — R10.32 LEFT LOWER QUADRANT ABDOMINAL PAIN: ICD-10-CM

## 2024-04-17 DIAGNOSIS — E66.3 OVERWEIGHT (BMI 25.0-29.9): ICD-10-CM

## 2024-04-17 DIAGNOSIS — Z00.00 WELLNESS EXAMINATION: ICD-10-CM

## 2024-04-17 DIAGNOSIS — M75.82 TENDINITIS OF LEFT ROTATOR CUFF: ICD-10-CM

## 2024-04-17 DIAGNOSIS — I15.9 SECONDARY HYPERTENSION: ICD-10-CM

## 2024-04-17 DIAGNOSIS — R97.20 ELEVATED PSA: ICD-10-CM

## 2024-04-17 PROCEDURE — 99396 PREV VISIT EST AGE 40-64: CPT | Performed by: INTERNAL MEDICINE

## 2024-04-17 PROCEDURE — 99214 OFFICE O/P EST MOD 30 MIN: CPT | Performed by: INTERNAL MEDICINE

## 2024-04-17 RX ORDER — BUSPIRONE HYDROCHLORIDE 7.5 MG/1
7.5 TABLET ORAL 2 TIMES DAILY PRN
Qty: 60 TABLET | Refills: 1 | Status: SHIPPED | OUTPATIENT
Start: 2024-04-17

## 2024-04-17 NOTE — PROGRESS NOTES
ADULT ANNUAL PHYSICAL  [facility] - [facility]    NAME: Jean-Pierre Rodríguez  AGE: 64 y.o. SEX: male  : 1960     DATE: 2024     Assessment and Plan:     Problem List Items Addressed This Visit        Cardiovascular and Mediastinum    Hypertension     Hypertension - controlled, I have counseled patient following healthy balance diet, I would like the patient reduce sodium, exercise routinely, I would like the patient continued the med current medical regiment and we will continue to monitor.            Musculoskeletal and Integument    Tendinitis of left rotator cuff     Start physical therapy symptoms are consistent with tendinitis rotator cuff         Relevant Orders    Ambulatory Referral to Physical Therapy       Urinary    Elevated PSA     Reviewed laboratories in detail will have patient see urology         Relevant Orders    Ambulatory Referral to Urology       Behavioral Health    Anxiety     Ongoing mild symptoms no SI tolerating BuSpar will increase BuSpar to 7.5 mg twice daily as needed supportive counseling I did complete paperwork to excuse him from jury duty         Relevant Medications    busPIRone (BUSPAR) 7.5 mg tablet       Surgery/Wound/Pain    Left lower quadrant abdominal pain     Rule out diverticulitis versus IBS will check CT abdomen pelvis will have patient see GI for colonoscopy if CAT scan negative         Relevant Orders    Ambulatory Referral to Gastroenterology    CT abdomen pelvis wo contrast       Other    Overweight (BMI 25.0-29.9)     I have counselled the pt to follow a healthy and balanced diet ,and recommend routine exercise.         Wellness examination     Assessment and plan 1.  Health maintenance annual wellness examination overall the patient is clinically stable and doing well, we encouraged the patient to follow a healthy and balanced diet.  We recommend that the patient exercise routinely approximately 30 minutes 5 times  per week .  We have reviewed the patient's vaccines and have made recommendations for updates if necessary annual flu shot    .  We will be ordering screening laboratories which are age appropriate.  Return to the office in   4 months   call if any problems.        Other Visit Diagnoses     Screening for colon cancer    -  Primary    Relevant Orders    Ambulatory Referral to Gastroenterology    Screening PSA (prostate specific antigen)        Relevant Orders    Ambulatory Referral to Urology        RTO in 6 months call for problems-year old female coming in for a follow up office visit regarding    Immunizations and preventive care screenings were discussed with patient today. Appropriate education was printed on patient's after visit summary.        Counseling:  Exercise: the importance of regular exercise/physical activity was discussed. Recommend exercise 3-5 times per week for at least 30 minutes.          Return in about 6 months (around 10/17/2024).     Chief Complaint:     Chief Complaint   Patient presents with   • Physical Exam     Patient is being seen for physical exam.       History of Present Illness:     Adult Annual Physical   Patient here for a comprehensive physical exam.  64-year old male coming in for a follow up visit regarding anxiety, left lower quadrant pain, tendinitis left shoulder rotator cuff, hypertension, overweight; the patient reports me compliant taking medications without untoward side effects the.  The patient is here to review his medical condition, update me on the medical condition and the patient reports me no hospitalizations and no ER visits.  Patient does report to me left lower quadrant pain mild over the last several weeks.  No blood in the stool no fever no chills.  Reports me left shoulder pain with movement of the shoulder no injuries.  Here to review laboratories in detail reports very mild increase in anxiety BuSpar is helpful he reports me he is not able to attend  jury duty in [place] because of his anxiety he will need an excuse for jury duty.  No fever no chills no nausea vomiting diarrhea no chest pain no shortness of breath.  Generally he has noticed onset of left shoulder pain is worsened with abduction internal and external rotation but there is not a specific injury he does report heavy lifting above his body awkward objects.  Left lower abd paoi  more frequent , since oct usually using tylenol, under some stress in the groin for yr since 42 , since 42 sight flare ups fever chills taking daily let her know that there is always constipation  Using buspar bid, not nauseaus , eating well at night hungary,  stressor federal jury duty,     Diet and Physical Activity  Diet/Nutrition: well balanced diet.   Exercise: walking.      Depression Screening  PHQ-2/9 Depression Screening    Little interest or pleasure in doing things: 0 - not at all  Feeling down, depressed, or hopeless: 0 - not at all  PHQ-2 Score: 0  PHQ-2 Interpretation: Negative depression screen       General Health  Sleep: sleeps well. 5-6  Hearing: normal - bilateral.  Vision: goes for regular eye exams.   Dental: regular dental visits.        Health  Symptoms include: none    Advanced Care Planning  Do you have an advanced directive? yes  Do you have a durable medical power of ? yes  ACP document given to patient? no     Review of Systems:     Review of Systems   Constitutional:  Negative for activity change, appetite change and unexpected weight change.   HENT:  Negative for congestion and postnasal drip.    Eyes:  Negative for visual disturbance.   Respiratory:  Negative for cough and shortness of breath.    Cardiovascular:  Negative for chest pain.   Gastrointestinal:  Positive for abdominal pain. Negative for diarrhea, nausea and vomiting.   Neurological:  Negative for dizziness, light-headedness and headaches.   Hematological:  Negative for adenopathy.   Psychiatric/Behavioral:  Negative  for suicidal ideas. The patient is nervous/anxious.       Past Medical History:     Past Medical History:   Diagnosis Date   • Anxiety 8/11/2022    unsure if this my condition   • Hypertension 6/10/2022    AT [facility] exam . said it was elevated   • Thyroid nodule 2014    Not cancerous      Past Surgical History:     Past Surgical History:   Procedure Laterality Date   • HERNIA REPAIR  1964    Toddler double hernia   • NM RPR AA HERNIA 1ST < 3 CM REDUCIBLE N/A 8/2/2023    Procedure: REPAIR HERNIA UMBILICAL;  Surgeon: Armando Ovalles MD;  [facility];  Service: General   • THYROIDECTOMY, PARTIAL        Family History:     Family History   Problem Relation Age of Onset   • Breast cancer Mother    • Hypertension Mother    • Atrial fibrillation Mother    • Heart disease Mother         Cog. Heart failure   • Hearing loss Mother    • Coronary artery disease Father    • Dementia Father         some mental issues      Social History:     Social History     Socioeconomic History   • Marital status: /Civil Union     Spouse name: None   • Number of children: None   • Years of education: None   • Highest education level: None   Occupational History   • None   Tobacco Use   • Smoking status: Never   • Smokeless tobacco: Never   Vaping Use   • Vaping status: Never Used   Substance and Sexual Activity   • Alcohol use: Yes     Alcohol/week: 10.0 standard drinks of alcohol     Types: 2 Glasses of wine, 8 Cans of beer per week   • Drug use: Never   • Sexual activity: Yes     Partners: Female   Other Topics Concern   • None   Social History Narrative   • None     Social Determinants of Health     Financial Resource Strain: Not on file   Food Insecurity: Not on file   Transportation Needs: Not on file   Physical Activity: Not on file   Stress: Not on file (2/11/2021)   Social Connections: Not on file   Intimate Partner Violence: Low Risk  (4/13/2021)    Received from [facility]    Intimate Partner Violence    •  "Insults You: Not on file    • Threatens You: Not on file    • Screams at You: Not on file    • Physically Hurt: Not on file    • Intimate Partner Violence Score: Not on file   Housing Stability: Not on file      Current Medications:     Current Outpatient Medications   Medication Sig Dispense Refill   • busPIRone (BUSPAR) 7.5 mg tablet Take 1 tablet (7.5 mg total) by mouth 2 (two) times a day as needed (anxiety) 60 tablet 1   • hydrochlorothiazide (HYDRODIURIL) 12.5 mg tablet TAKE 1 TABLET BY MOUTH  DAILY 90 tablet 1   • ketoconazole (NIZORAL) 2 % shampoo SHAMPOO SCALP 3 TIMES PER WEEK, KEEP ON 5 MINUTES THEN WASH OFF, THEN AS NEEDED.     • Melatonin 5 MG TABS Take 5 mg by mouth 1 -2 tabs as needed     • NON FORMULARY 1 tablet daily Calm Aide     • Omega-3 Fatty Acids (FISH OIL) 1,000 mg daily     • Turmeric 1053 MG TABS in the morning     • valsartan (DIOVAN) 160 mg tablet TAKE 1 TABLET BY MOUTH  DAILY 90 tablet 1     No current facility-administered medications for this visit.      Allergies:     Allergies   Allergen Reactions   • No Active Allergies       Physical Exam:     /86 (BP Location: Left arm, Patient Position: Sitting, Cuff Size: Large)   Pulse 73   Temp 98.9 °F (37.2 °C) (Tympanic)   Resp 16   Ht 6' 2\" (1.88 m)   Wt 96.4 kg (212 lb 9.6 oz)   SpO2 97%   BMI 27.30 kg/m²     Physical Exam  Vitals and nursing note reviewed.   Constitutional:       General: He is not in acute distress.     Appearance: Normal appearance. He is well-developed. He is not ill-appearing, toxic-appearing or diaphoretic.   HENT:      Head: Normocephalic and atraumatic.      Right Ear: External ear normal.      Left Ear: External ear normal.      Nose: Nose normal.   Eyes:      Pupils: Pupils are equal, round, and reactive to light.   Cardiovascular:      Rate and Rhythm: Normal rate and regular rhythm.      Heart sounds: Normal heart sounds. No murmur heard.  Pulmonary:      Effort: Pulmonary effort is normal.      " Breath sounds: Normal breath sounds.   Abdominal:      General: There is no distension.      Palpations: Abdomen is soft.      Tenderness: There is no abdominal tenderness. There is no guarding.   Neurological:      Mental Status: He is alert.   Psychiatric:         Mood and Affect: Mood is anxious. Mood is not depressed.         Thought Content: Thought content does not include suicidal ideation.     Mild pain with internal/external rotation of the left shoulder    Murray Jimenes DO  [facility]

## 2024-04-18 ENCOUNTER — TELEPHONE (OUTPATIENT)
Age: 64
End: 2024-04-18

## 2024-04-18 NOTE — TELEPHONE ENCOUNTER
Rep from [facility] called, patient called to schedule a STAT cat scan that requires pre-cert. (Order in chart)  Appt 04/18/24 @ 4:30pm  [facility]   [address]  [id number]    [place] [id number]  Dx: R10.32  Phone # for their pre-cert dept [phone]  Fax [phone]

## 2024-04-21 PROBLEM — M75.82 TENDINITIS OF LEFT ROTATOR CUFF: Status: ACTIVE | Noted: 2024-04-21

## 2024-04-21 PROBLEM — R10.32 LEFT LOWER QUADRANT ABDOMINAL PAIN: Status: ACTIVE | Noted: 2024-04-21

## 2024-04-21 NOTE — ASSESSMENT & PLAN NOTE
Rule out diverticulitis versus IBS will check CT abdomen pelvis will have patient see GI for colonoscopy if CAT scan negative

## 2024-04-21 NOTE — ASSESSMENT & PLAN NOTE
Ongoing mild symptoms no SI tolerating BuSpar will increase BuSpar to 7.5 mg twice daily as needed supportive counseling I did complete paperwork to excuse him from jury duty

## 2024-04-21 NOTE — ASSESSMENT & PLAN NOTE
Assessment and plan 1.  Health maintenance annual wellness examination overall the patient is clinically stable and doing well, we encouraged the patient to follow a healthy and balanced diet.  We recommend that the patient exercise routinely approximately 30 minutes 5 times per week .  We have reviewed the patient's vaccines and have made recommendations for updates if necessary annual flu shot    .  We will be ordering screening laboratories which are age appropriate.  Return to the office in   4 months   call if any problems.

## 2024-05-14 DIAGNOSIS — F41.9 ANXIETY: ICD-10-CM

## 2024-05-15 RX ORDER — BUSPIRONE HYDROCHLORIDE 7.5 MG/1
7.5 TABLET ORAL 2 TIMES DAILY PRN
Qty: 180 TABLET | Refills: 0 | Status: SHIPPED | OUTPATIENT
Start: 2024-05-15

## 2024-05-15 NOTE — TELEPHONE ENCOUNTER
Caller: Jean-Pierre (Patient)    Reason for call: Patient would like a 90 day supply per pharmacy/insurance. Needs sent to [facility].     Call back#: [phone]

## 2024-05-17 DIAGNOSIS — I10 ESSENTIAL HYPERTENSION: ICD-10-CM

## 2024-05-17 RX ORDER — VALSARTAN 160 MG/1
TABLET ORAL
Qty: 90 TABLET | Refills: 3 | Status: SHIPPED | OUTPATIENT
Start: 2024-05-17

## 2024-05-24 ENCOUNTER — TELEPHONE (OUTPATIENT)
Age: 64
End: 2024-05-24

## 2024-05-24 DIAGNOSIS — M25.519 SHOULDER PAIN, UNSPECIFIED CHRONICITY, UNSPECIFIED LATERALITY: Primary | ICD-10-CM

## 2024-05-24 DIAGNOSIS — M54.2 NECK PAIN: ICD-10-CM

## 2024-05-24 NOTE — TELEPHONE ENCOUNTER
Patient called stating he is in need of a new order for physical therapy for both the left shoulder AND left side of neck. Patient will be going to [facility] for physical therapy with  Dr Rolan Tijerina. Please place order into patient's myChart so patient can download and print.

## 2024-06-30 DIAGNOSIS — I10 ESSENTIAL HYPERTENSION: ICD-10-CM

## 2024-07-01 RX ORDER — HYDROCHLOROTHIAZIDE 12.5 MG/1
TABLET ORAL
Qty: 90 TABLET | Refills: 1 | Status: SHIPPED | OUTPATIENT
Start: 2024-07-01

## 2024-08-03 DIAGNOSIS — F41.9 ANXIETY: ICD-10-CM

## 2024-08-04 RX ORDER — BUSPIRONE HYDROCHLORIDE 7.5 MG/1
7.5 TABLET ORAL 2 TIMES DAILY PRN
Qty: 180 TABLET | Refills: 1 | Status: SHIPPED | OUTPATIENT
Start: 2024-08-04

## 2024-08-19 ENCOUNTER — HOSPITAL ENCOUNTER (OUTPATIENT)
Dept: ULTRASOUND IMAGING | Facility: HOSPITAL | Age: 64
Discharge: HOME | End: 2024-08-19
Payer: COMMERCIAL

## 2024-08-19 DIAGNOSIS — C73 MALIGNANT NEOPLASM OF THYROID GLAND (HCC): ICD-10-CM

## 2024-08-19 PROCEDURE — 76536 US EXAM OF HEAD AND NECK: CPT

## 2024-08-28 ENCOUNTER — OFFICE VISIT (OUTPATIENT)
Dept: INTERNAL MEDICINE CLINIC | Facility: CLINIC | Age: 64
End: 2024-08-28
Payer: COMMERCIAL

## 2024-08-28 VITALS
TEMPERATURE: 98 F | BODY MASS INDEX: 27.72 KG/M2 | RESPIRATION RATE: 18 BRPM | SYSTOLIC BLOOD PRESSURE: 150 MMHG | HEART RATE: 67 BPM | HEIGHT: 74 IN | DIASTOLIC BLOOD PRESSURE: 70 MMHG | OXYGEN SATURATION: 97 % | WEIGHT: 216 LBS

## 2024-08-28 DIAGNOSIS — Z13.6 SCREENING FOR CARDIOVASCULAR CONDITION: Primary | ICD-10-CM

## 2024-08-28 DIAGNOSIS — E66.3 OVERWEIGHT (BMI 25.0-29.9): ICD-10-CM

## 2024-08-28 DIAGNOSIS — I15.9 SECONDARY HYPERTENSION: ICD-10-CM

## 2024-08-28 DIAGNOSIS — E04.1 THYROID NODULE: ICD-10-CM

## 2024-08-28 DIAGNOSIS — R73.03 PREDIABETES: ICD-10-CM

## 2024-08-28 DIAGNOSIS — K40.90 NON-RECURRENT UNILATERAL INGUINAL HERNIA WITHOUT OBSTRUCTION OR GANGRENE: ICD-10-CM

## 2024-08-28 DIAGNOSIS — E78.5 HYPERLIPIDEMIA, UNSPECIFIED HYPERLIPIDEMIA TYPE: ICD-10-CM

## 2024-08-28 DIAGNOSIS — F41.9 ANXIETY: ICD-10-CM

## 2024-08-28 PROCEDURE — 99214 OFFICE O/P EST MOD 30 MIN: CPT | Performed by: INTERNAL MEDICINE

## 2024-08-28 NOTE — PROGRESS NOTES
Assessment/Plan:    Prediabetes  Pre diabetes -I have counseled the patient to follow a healthy balanced diet, I have counseled patient reduce carbohydrates and sweets in the diet, I would like the patient exercise routinely.  I will be checking hemoglobin A1c and comprehensive metabolic panel.  Have counseled patient about the prevention of diabetes, and the risk of progression to type 2 diabetes.    Overweight (BMI 25.0-29.9)  I have counselled the pt to follow a healthy and balanced diet ,and recommend routine exercise.    Hyperlipidemia  Hypertension - mildly elevated today continue HCTZ 12.5 mg once daily, Diovan 160 mg once daily have asked him to start monitoring the blood pressure daily report his readings in 1 week    Anxiety  Supportive listening and counseling provided he is doing much better on BuSpar 7.5 mg twice daily as needed reports me stressors have improved he is doing much better    Non-recurrent unilateral inguinal hernia without obstruction or gangrene  Asymptomatic incidental finding on CT scan I have given him the option to see a general surgeon versus observation at this point in time the patient would like to consider if he develops any symptoms of pain in the groin or bulging he will notify me for general surgery consultation    Thyroid nodule  Reviewed ultrasound small nodule 0.5 x 0.5 x 0.7 cm no additional follow-up is required         Problem List Items Addressed This Visit        Cardiovascular and Mediastinum    Hypertension       Endocrine    Thyroid nodule     Reviewed ultrasound small nodule 0.5 x 0.5 x 0.7 cm no additional follow-up is required            Behavioral Health    Anxiety     Supportive listening and counseling provided he is doing much better on BuSpar 7.5 mg twice daily as needed reports me stressors have improved he is doing much better            Other    Overweight (BMI 25.0-29.9)     I have counselled the pt to follow a healthy and balanced diet ,and recommend  routine exercise.         Prediabetes     Pre diabetes -I have counseled the patient to follow a healthy balanced diet, I have counseled patient reduce carbohydrates and sweets in the diet, I would like the patient exercise routinely.  I will be checking hemoglobin A1c and comprehensive metabolic panel.  Have counseled patient about the prevention of diabetes, and the risk of progression to type 2 diabetes.         Relevant Orders    Hemoglobin A1C    Hyperlipidemia     Hypertension - mildly elevated today continue HCTZ 12.5 mg once daily, Diovan 160 mg once daily have asked him to start monitoring the blood pressure daily report his readings in 1 week         Screening for cardiovascular condition - Primary    Relevant Orders    Stress test only, exercise    Comprehensive metabolic panel    Lipid Panel with Direct LDL reflex    Non-recurrent unilateral inguinal hernia without obstruction or gangrene     Asymptomatic incidental finding on CT scan I have given him the option to see a general surgeon versus observation at this point in time the patient would like to consider if he develops any symptoms of pain in the groin or bulging he will notify me for general surgery consultation        RTO in 6 months call clinic problems        Subjective:   Narrative & Impression   THYROID ULTRASOUND     INDICATION: C73: Malignant neoplasm of thyroid gland.     COMPARISON: None     TECHNIQUE: Ultrasound of the thyroid was performed with a high frequency linear transducer in transverse and sagittal planes including volumetric imaging sweeps as well as traditional still imaging technique.     FINDINGS:  Thyroid texture: Normal homogeneous smooth echotexture.     Right lobe: 5.1 x 1.8 x 2.2 cm. Volume 9.7 mL  Left lobe: Surgically absent.  Isthmus: 0.3 cm.     Nodule #1. Image 7.  RIGHT midgland nodule measuring 0.5 x 0.5 x 0.7 cm.  COMPOSITION: 2 points, solid or almost completely solid.  ECHOGENICITY: 2 points,  hypoechoic.  SHAPE: 0 points, wider-than-tall.  MARGIN: 0 points, smooth.  ECHOGENIC FOCI: 0 points, none or large comet-tail artifacts.  TI-RADS Classification: TR 4 (4-6 points). FNA if >/= 1.5 cm. Follow if >/= 1 cm.           Patient ID: Jean-Pierre Rodríguez is a 64 y.o. male.  A small fat-containing   right inguinal hernia is noted.   HPI 64-year old male coming in for a follow up visit regarding prediabetes, overweight, hypertension, hyperlipidemia, anxiety, inguinal hernia asymptomatic and thyroid nodule small; the patient reports me compliant taking medications without untoward side effects the.  The patient is here to review his medical condition, update me on the medical condition and the patient reports me no hospitalizations and no ER visits.  No injuries no illnesses.  To update his condition compared to prior visit following a healthy and balanced diet reports any anxiety levels have improved significantly they are to review laboratories and test including ultrasound thyroid and a stable small thyroid nodule.  He reports his anxiety is well-controlled with BuSpar 7.5 mg twice daily tolerating well reports me situations have improved work is going well.  No depression    The following portions of the patient's history were reviewed and updated as appropriate: allergies, current medications, past family history, past medical history, past social history, past surgical history and problem list.    Review of Systems   Constitutional:  Negative for activity change, appetite change and unexpected weight change.   HENT:  Negative for congestion and postnasal drip.    Eyes:  Negative for visual disturbance.   Respiratory:  Negative for cough and shortness of breath.    Cardiovascular:  Negative for chest pain.   Gastrointestinal:  Negative for abdominal pain, diarrhea, nausea and vomiting.   Neurological:  Negative for dizziness, light-headedness and headaches.   Hematological:  Negative for adenopathy.    Anxiety  improved    Objective:    Return in about 6 months (around 2/28/2025), or if symptoms worsen or fail to improve.    Procedure: US thyroid    Result Date: 8/22/2024  Narrative: THYROID ULTRASOUND INDICATION: C73: Malignant neoplasm of thyroid gland. COMPARISON: None TECHNIQUE: Ultrasound of the thyroid was performed with a high frequency linear transducer in transverse and sagittal planes including volumetric imaging sweeps as well as traditional still imaging technique. FINDINGS: Thyroid texture: Normal homogeneous smooth echotexture. Right lobe: 5.1 x 1.8 x 2.2 cm. Volume 9.7 mL Left lobe: Surgically absent. Isthmus: 0.3 cm. Nodule #1. Image 7. RIGHT midgland nodule measuring 0.5 x 0.5 x 0.7 cm. COMPOSITION: 2 points, solid or almost completely solid. ECHOGENICITY: 2 points, hypoechoic. SHAPE: 0 points, wider-than-tall. MARGIN: 0 points, smooth. ECHOGENIC FOCI: 0 points, none or large comet-tail artifacts. TI-RADS Classification: TR 4 (4-6 points). FNA if >/= 1.5 cm. Follow if >/= 1 cm. No suspicious findings in the left thyroidectomy bed.     Impression: Essentially normal exam post left lobectomy. No nodule meets current ACR criteria for requiring biopsy or follow-up ultrasounds. Reference: ACR Thyroid Imaging, Reporting and Data System (TI-RADS): White Paper of the ACR TI-RADS Committee. J AM Fredi Radiol 2017;14:587-595. Additional recommendations based on American Thyroid Association 2015 guidelines. Workstation performed: [id number]        Allergies   Allergen Reactions   • No Active Allergies        Past Medical History:   Diagnosis Date   • Anxiety 8/11/2022    unsure if this my condition   • Hypertension 6/10/2022    AT [facility] exam . said it was elevated   • Thyroid nodule 2014    Not cancerous     Past Surgical History:   Procedure Laterality Date   • HERNIA REPAIR  1964    Toddler double hernia   • VT RPR AA HERNIA 1ST < 3 CM REDUCIBLE N/A 8/2/2023    Procedure: REPAIR HERNIA UMBILICAL;  Surgeon: Armando  Yash Ovalles MD;  Location: [facility];  Service: General   • THYROIDECTOMY, PARTIAL       Current Outpatient Medications on File Prior to Visit   Medication Sig Dispense Refill   • busPIRone (BUSPAR) 7.5 mg tablet TAKE 1 TABLET BY MOUTH TWICE  DAILY AS NEEDED FOR ANXIETY 180 tablet 1   • hydroCHLOROthiazide 12.5 mg tablet TAKE 1 TABLET BY MOUTH DAILY 90 tablet 1   • ketoconazole (NIZORAL) 2 % shampoo SHAMPOO SCALP 3 TIMES PER WEEK, KEEP ON 5 MINUTES THEN WASH OFF, THEN AS NEEDED.     • Melatonin 5 MG TABS Take 5 mg by mouth 1 -2 tabs as needed     • NON FORMULARY 1 tablet daily Calm Aide     • Omega-3 Fatty Acids (FISH OIL) 1,000 mg daily     • Turmeric 1053 MG TABS in the morning     • valsartan (DIOVAN) 160 mg tablet TAKE 1 TABLET BY MOUTH DAILY 90 tablet 3     No current facility-administered medications on file prior to visit.     Family History   Problem Relation Age of Onset   • Breast cancer Mother    • Hypertension Mother    • Atrial fibrillation Mother    • Heart disease Mother         Cog. Heart failure   • Hearing loss Mother    • Coronary artery disease Father    • Dementia Father         some mental issues     Social History     Socioeconomic History   • Marital status: /Civil Union     Spouse name: Not on file   • Number of children: Not on file   • Years of education: Not on file   • Highest education level: Not on file   Occupational History   • Not on file   Tobacco Use   • Smoking status: Never   • Smokeless tobacco: Never   Vaping Use   • Vaping status: Never Used   Substance and Sexual Activity   • Alcohol use: Yes     Alcohol/week: 10.0 standard drinks of alcohol     Types: 2 Glasses of wine, 8 Cans of beer per week   • Drug use: Never   • Sexual activity: Yes     Partners: Female   Other Topics Concern   • Not on file   Social History Narrative   • Not on file     Social Determinants of Health     Financial Resource Strain: Not on file   Food Insecurity: Not on file   Transportation  "Needs: Not on file   Physical Activity: Not on file   Stress: Not on file (2/11/2021)   Social Connections: Not on file   Intimate Partner Violence: Low Risk  (4/13/2021)    [facility]    Intimate Partner Violence    • Insults You: Not on file    • Threatens You: Not on file    • Screams at You: Not on file    • Physically Hurt: Not on file    • Intimate Partner Violence Score: Not on file   Housing Stability: Not on file     Vitals:    08/28/24 1101   BP: 150/70   BP Location: Left arm   Patient Position: Sitting   Cuff Size: Standard   Pulse: 67   Resp: 18   Temp: 98 °F (36.7 °C)   TempSrc: Tympanic   SpO2: 97%   Weight: 98 kg (216 lb)   Height: 6' 2\" (1.88 m)     Results for orders placed or performed in visit on 04/03/24   Hemoglobin A1C   Result Value Ref Range    Hemoglobin A1C 5.6      Weight (last 2 days)     None        Body mass index is 27.73 kg/m².  BP      Temp      Pulse     Resp      SpO2        Vitals:    08/28/24 1101   Weight: 98 kg (216 lb)     Vitals:    08/28/24 1101   Weight: 98 kg (216 lb)       /70 (BP Location: Left arm, Patient Position: Sitting, Cuff Size: Standard)   Pulse 67   Temp 98 °F (36.7 °C) (Tympanic)   Resp 18   Ht 6' 2\" (1.88 m)   Wt 98 kg (216 lb)   SpO2 97%   BMI 27.73 kg/m²          Physical Exam  Vitals and nursing note reviewed.   Constitutional:       General: He is not in acute distress.     Appearance: He is well-developed. He is not diaphoretic.   HENT:      Head: Normocephalic and atraumatic.      Right Ear: External ear normal.      Left Ear: External ear normal.      Mouth/Throat:      Mouth: Oropharynx is clear and moist.   Eyes:      General: No scleral icterus.        Right eye: No discharge.         Left eye: No discharge.      Conjunctiva/sclera: Conjunctivae normal.      Pupils: Pupils are equal, round, and reactive to light.   Cardiovascular:      Rate and Rhythm: Normal rate and regular rhythm.      Heart sounds: " Normal heart sounds. No murmur heard.     No friction rub. No gallop.   Pulmonary:      Effort: No respiratory distress.      Breath sounds: No wheezing or rales.   Abdominal:      General: Bowel sounds are normal. There is no distension.      Palpations: Abdomen is soft. There is no mass.      Tenderness: There is no abdominal tenderness. There is no guarding or rebound.   Musculoskeletal:         General: No deformity or edema.      Cervical back: Neck supple.   Lymphadenopathy:      Cervical: No cervical adenopathy.   Neurological:      Mental Status: He is alert.   Psychiatric:         Mood and Affect: Mood and affect normal. Mood is not anxious or depressed.         Thought Content: Thought content does not include suicidal ideation.

## 2024-09-02 PROBLEM — K40.90 NON-RECURRENT UNILATERAL INGUINAL HERNIA WITHOUT OBSTRUCTION OR GANGRENE: Status: ACTIVE | Noted: 2024-09-02

## 2024-09-02 PROBLEM — E04.1 THYROID NODULE: Status: ACTIVE | Noted: 2024-09-02

## 2024-09-02 PROBLEM — Z13.6 SCREENING FOR CARDIOVASCULAR CONDITION: Status: ACTIVE | Noted: 2021-04-14

## 2024-09-02 NOTE — ASSESSMENT & PLAN NOTE
Hypertension - mildly elevated today continue HCTZ 12.5 mg once daily, Diovan 160 mg once daily have asked him to start monitoring the blood pressure daily report his readings in 1 week

## 2024-09-02 NOTE — ASSESSMENT & PLAN NOTE
Asymptomatic incidental finding on CT scan I have given him the option to see a general surgeon versus observation at this point in time the patient would like to consider if he develops any symptoms of pain in the groin or bulging he will notify me for general surgery consultation

## 2024-09-02 NOTE — ASSESSMENT & PLAN NOTE
Supportive listening and counseling provided he is doing much better on BuSpar 7.5 mg twice daily as needed reports me stressors have improved he is doing much better

## 2024-10-02 ENCOUNTER — OFFICE VISIT (OUTPATIENT)
Dept: UROLOGY | Facility: AMBULATORY SURGERY CENTER | Age: 64
End: 2024-10-02
Payer: COMMERCIAL

## 2024-10-02 VITALS
WEIGHT: 216 LBS | HEIGHT: 74 IN | DIASTOLIC BLOOD PRESSURE: 78 MMHG | SYSTOLIC BLOOD PRESSURE: 126 MMHG | HEART RATE: 90 BPM | BODY MASS INDEX: 27.72 KG/M2 | OXYGEN SATURATION: 96 %

## 2024-10-02 DIAGNOSIS — R97.20 ELEVATED PSA: ICD-10-CM

## 2024-10-02 DIAGNOSIS — Z12.5 SCREENING PSA (PROSTATE SPECIFIC ANTIGEN): ICD-10-CM

## 2024-10-02 PROBLEM — Z13.6 SCREENING FOR CARDIOVASCULAR CONDITION: Status: RESOLVED | Noted: 2021-04-14 | Resolved: 2024-10-02

## 2024-10-02 PROCEDURE — 99213 OFFICE O/P EST LOW 20 MIN: CPT

## 2024-10-02 NOTE — ASSESSMENT & PLAN NOTE
Patient's most recent PSA returned at a value of 1.50.  Refer to PSA trend below.  KESHAWN performed today.  Palpably benign prostate.  Refer to physical exam findings.  Patient will obtain a PSA in 1 year from his last and follow-up in the office at that time to undergo KESHAWN.    PSA Trend:  1.50 - 9/30/24  2.19 - 7/12/24   1.29 - 6/9/23  3.42 - 6/10/22

## 2024-10-02 NOTE — PROGRESS NOTES
10/2/2024      Assessment and Plan    64 y.o. male managed by our office    Screening PSA (prostate specific antigen)  Patient's most recent PSA returned at a value of 1.50.  Refer to PSA trend below.  KESHAWN performed today.  Palpably benign prostate.  Refer to physical exam findings.  Patient will obtain a PSA in 1 year from his last and follow-up in the office at that time to undergo KESHAWN.    PSA Trend:  1.50 - 9/30/24  2.19 - 7/12/24   1.29 - 6/9/23  3.42 - 6/10/22        History of Present Illness  Jean-Pierre Rodríguez is a 64 y.o. male here for evaluation of history of elevated PSA.  Patient was last seen in the office on 11/21/2022.  Patient's most recent PSA was performed 9/30/2024 and returned at a value of 1.50.  Patient is not currently on any pharmacotherapy for her lower urinary tract symptoms.  Today, the patient offers no new lower urinary tract complaints states that he has a good urinary stream and denies feelings of incomplete bladder emptying..  Dinah is currently content with his voiding pattern.  Patient denies dysuria, hematuria, flank pain, worsening urinary frequency/urgency, or postvoid dribbling.        Review of Systems   Constitutional:  Negative for chills and fever.   HENT:  Negative for ear pain and sore throat.    Eyes:  Negative for pain and visual disturbance.   Respiratory:  Negative for cough and shortness of breath.    Cardiovascular:  Negative for chest pain and palpitations.   Gastrointestinal:  Negative for abdominal pain and vomiting.   Genitourinary:  Negative for decreased urine volume, difficulty urinating, dysuria, flank pain, frequency, hematuria and urgency.   Musculoskeletal:  Negative for arthralgias and back pain.   Skin:  Negative for color change and rash.   Neurological:  Negative for seizures and syncope.   All other systems reviewed and are negative.          AUA SYMPTOM SCORE      Flowsheet Row Most Recent Value   AUA SYMPTOM SCORE    How often have you had a sensation  "of not emptying your bladder completely after you finished urinating? 0 (P)     How often have you had to urinate again less than two hours after you finished urinating? 0 (P)     How often have you found you stopped and started again several times when you urinate? 0 (P)     How often have you found it difficult to postpone urination? 0 (P)     How often have you had a weak urinary stream? 0 (P)     How often have you had to push or strain to begin urination? 0 (P)     How many times did you most typically get up to urinate from the time you went to bed at night until the time you got up in the morning? 1 (P)     Quality of Life: If you were to spend the rest of your life with your urinary condition just the way it is now, how would you feel about that? 0 (P)     AUA SYMPTOM SCORE 1 (P)               Vitals  Vitals:    10/02/24 1537   BP: 126/78   BP Location: Left arm   Patient Position: Sitting   Cuff Size: Standard   Pulse: 90   SpO2: 96%   Weight: 98 kg (216 lb)   Height: 6' 2\" (1.88 m)       Physical Exam  Vitals reviewed.   Constitutional:       General: He is not in acute distress.     Appearance: Normal appearance. He is not ill-appearing.   HENT:      Head: Normocephalic and atraumatic.      Nose: Nose normal.   Eyes:      General: No scleral icterus.  Pulmonary:      Effort: No respiratory distress.   Abdominal:      General: Abdomen is flat. There is no distension.      Palpations: Abdomen is soft.      Tenderness: There is no abdominal tenderness.   Genitourinary:     Comments: KESHAWN performed today.  Prostate estimated to be about 40 to 45 g and smooth bilaterally without nodularity or induration.  Musculoskeletal:         General: Normal range of motion.      Cervical back: Normal range of motion.   Skin:     General: Skin is warm.      Coloration: Skin is not jaundiced.   Neurological:      Mental Status: He is alert and oriented to person, place, and time.      Gait: Gait normal.   Psychiatric:        "  Mood and Affect: Mood normal.         Behavior: Behavior normal.           Past History  Past Medical History:   Diagnosis Date    Anxiety 8/11/2022    unsure if this my condition    Hypertension 6/10/2022    AT [facility] exam . said it was elevated    Thyroid nodule 2014    Not cancerous     Social History     Socioeconomic History    Marital status: /Civil Union     Spouse name: None    Number of children: None    Years of education: None    Highest education level: None   Occupational History    None   Tobacco Use    Smoking status: Never    Smokeless tobacco: Never   Vaping Use    Vaping status: Never Used   Substance and Sexual Activity    Alcohol use: Yes     Alcohol/week: 10.0 standard drinks of alcohol     Types: 2 Glasses of wine, 8 Cans of beer per week    Drug use: Never    Sexual activity: Yes     Partners: Female   Other Topics Concern    None   Social History Narrative    None     Social Determinants of Health     Financial Resource Strain: Not on file   Food Insecurity: Not on file   Transportation Needs: Not on file   Physical Activity: Not on file   Stress: Not on file (2/11/2021)   Social Connections: Not on file   Intimate Partner Violence: Low Risk  (4/13/2021)    Received from [facility], [facility]    Intimate Partner Violence     Insults You: Not on file     Threatens You: Not on file     Screams at You: Not on file     Physically Hurt: Not on file     Intimate Partner Violence Score: Not on file   Housing Stability: Not on file     Social History     Tobacco Use   Smoking Status Never   Smokeless Tobacco Never     Family History   Problem Relation Age of Onset    Breast cancer Mother     Hypertension Mother     Atrial fibrillation Mother     Heart disease Mother         Cog. Heart failure    Hearing loss Mother     Coronary artery disease Father     Dementia Father         some mental issues       The following portions of the patient's history were reviewed and updated as  "appropriate: allergies, current medications, past medical history, past social history, past surgical history and problem list.    Results  No results found for this or any previous visit (from the past 1 hour(s)).]  No results found for: \"PSA\"  Lab Results   Component Value Date    CALCIUM 9.1 07/21/2023    K 3.8 07/21/2023    CO2 28 07/21/2023     07/21/2023    BUN 18 07/21/2023    CREATININE 0.99 07/21/2023     Lab Results   Component Value Date    WBC 9.26 07/21/2023    HGB 15.4 07/21/2023    HCT 46.4 07/21/2023    MCV 92 07/21/2023     07/21/2023      "

## 2024-10-31 ENCOUNTER — HOSPITAL ENCOUNTER (OUTPATIENT)
Dept: NON INVASIVE DIAGNOSTICS | Facility: CLINIC | Age: 64
Discharge: HOME | End: 2024-10-31
Payer: COMMERCIAL

## 2024-10-31 DIAGNOSIS — Z13.6 SCREENING FOR CARDIOVASCULAR CONDITION: ICD-10-CM

## 2024-10-31 LAB
CHEST PAIN STATEMENT: NORMAL
MAX DIASTOLIC BP: 68 MMHG
MAX HR PERCENT: 96 %
MAX HR: 153 BPM
MAX PREDICTED HEART RATE: 158 BPM
PROTOCOL NAME: NORMAL
RATE PRESSURE PRODUCT: NORMAL
REASON FOR TERMINATION: NORMAL
SL CV STRESS RECOVERY BP: NORMAL MMHG
SL CV STRESS RECOVERY HR: 95 BPM
SL CV STRESS RECOVERY O2 SAT: 99 %
SL CV STRESS STAGE REACHED: 3
STRESS ANGINA INDEX: 0
STRESS BASELINE BP: NORMAL MMHG
STRESS BASELINE HR: 65 BPM
STRESS O2 SAT REST: 100 %
STRESS PEAK HR: 153 BPM
STRESS POST ESTIMATED WORKLOAD: 10.1 METS
STRESS POST EXERCISE DUR MIN: 8 MIN
STRESS POST EXERCISE DUR MIN: 8 MIN
STRESS POST EXERCISE DUR SEC: 0 SEC
STRESS POST O2 SAT PEAK: 99 %
STRESS POST PEAK BP: 170 MMHG
STRESS POST PEAK HR: 153 BPM
STRESS POST PEAK SYSTOLIC BP: 176 MMHG
TARGET HR FORMULA: NORMAL
TEST INDICATION: NORMAL

## 2024-10-31 PROCEDURE — 93016 CV STRESS TEST SUPVJ ONLY: CPT | Performed by: INTERNAL MEDICINE

## 2024-10-31 PROCEDURE — 93018 CV STRESS TEST I&R ONLY: CPT | Performed by: INTERNAL MEDICINE

## 2024-10-31 PROCEDURE — 93017 CV STRESS TEST TRACING ONLY: CPT

## 2024-11-01 PROBLEM — Z12.5 SCREENING PSA (PROSTATE SPECIFIC ANTIGEN): Status: RESOLVED | Noted: 2024-10-02 | Resolved: 2024-11-01

## 2024-11-03 DIAGNOSIS — I49.3 PVC (PREMATURE VENTRICULAR CONTRACTION): Primary | ICD-10-CM

## 2024-11-22 ENCOUNTER — TELEPHONE (OUTPATIENT)
Age: 64
End: 2024-11-22

## 2024-11-22 NOTE — TELEPHONE ENCOUNTER
Jean-Pierre needs a code for his stress that he had back in Oct to be sent to [facility]. He spoke to them because his insurance would not cover it as a routine test. Can Dr Jimenes please change the wording or code to say that it was necessary  for this test because of family history. Please Advise he would like a call back or message in [facility] [employer] once done. Thank you

## 2024-12-02 NOTE — TELEPHONE ENCOUNTER
Patient called in to follow-up on previous message. Made patient aware awaiting Dr. Jimenes to change the dx code for it to be resubmitted. Patient would like a call back once resubmitted. Thank you.

## 2024-12-25 DIAGNOSIS — I10 ESSENTIAL HYPERTENSION: ICD-10-CM

## 2024-12-26 RX ORDER — HYDROCHLOROTHIAZIDE 12.5 MG/1
12.5 TABLET ORAL DAILY
Qty: 90 TABLET | Refills: 1 | Status: SHIPPED | OUTPATIENT
Start: 2024-12-26

## 2025-01-08 ENCOUNTER — OFFICE VISIT (OUTPATIENT)
Dept: CARDIOLOGY CLINIC | Facility: CLINIC | Age: 65
End: 2025-01-08
Payer: COMMERCIAL

## 2025-01-08 VITALS
BODY MASS INDEX: 28.4 KG/M2 | DIASTOLIC BLOOD PRESSURE: 88 MMHG | OXYGEN SATURATION: 99 % | HEART RATE: 74 BPM | SYSTOLIC BLOOD PRESSURE: 142 MMHG | WEIGHT: 221.2 LBS

## 2025-01-08 DIAGNOSIS — I49.3 PVC (PREMATURE VENTRICULAR CONTRACTION): ICD-10-CM

## 2025-01-08 DIAGNOSIS — R06.09 DYSPNEA ON EXERTION: ICD-10-CM

## 2025-01-08 DIAGNOSIS — E78.2 MIXED HYPERLIPIDEMIA: Primary | ICD-10-CM

## 2025-01-08 DIAGNOSIS — I10 PRIMARY HYPERTENSION: ICD-10-CM

## 2025-01-08 PROCEDURE — 99205 OFFICE O/P NEW HI 60 MIN: CPT | Performed by: INTERNAL MEDICINE

## 2025-01-08 PROCEDURE — 93000 ELECTROCARDIOGRAM COMPLETE: CPT | Performed by: INTERNAL MEDICINE

## 2025-01-08 NOTE — PROGRESS NOTES
[facility] [place]  [address]  [address]  [facility] [address]  Phone#  [phone]  Fax#  [phone]  [facility] Cardiology Office Consultation             NAME: Jean-Pierre Rodríguez  AGE: 64 y.o. SEX: male   : 1960   MRN: [id number]    DATE: 2025  TIME: 3:06 PM    Cardiology Problem list:  PVCs on treadmill:  10/24: Stress test-no ischemia, frequent PVCs  during stress and recovery  Prediabetes  Dyslipidemia  Hypertension      Assessment & Plan  PVC (premature ventricular contraction)  PVCs noted on stress test.  Study reviewed.  Frequent PVCs during exercise and recovery.  No definite ischemia on stress testing.  Functional capacity average.  EKG- abnormal: NSR, PVC, RAD.  Echo requested for evaluation of LV function.  Holter to   Consider further ischemic evaluation with  calcium score.  Orders:    POCT ECG    Ambulatory Referral to Cardiology    Holter monitor; Future    Echo complete w/ contrast if indicated; Future    CT coronary calcium score; Future    Mixed hyperlipidemia  Lipids from  reviewed: , HDL 57, triglycerides 118.  Orders:    CT coronary calcium score; Future    Primary hypertension  BP OK at home.  Continue lifestyle modification.   Medical therapy reviewed.  Close blood pressure monitoring.         Dyspnea on exertion  Recent report of exertional dyspnea and fatigue.  Symptoms also noted on treadmill.  Echocardiogram requested for evaluation of LV function.  Further ischemic evaluation advised.  Discussed coronary CTA versus calcium scoring.  Orders:    Echo complete w/ contrast if indicated; Future    CT coronary calcium score; Future           HPI:    Jean-Pierre Rodríguez is a 64 y.o.-year-old male who presents to the cardiology clinic for initial consultation.  He has a history of prediabetes, hypertension, dyslipidemia and family history of coronary artery disease.  He was seen by Dr. Jimenes back in August and underwent a stress test in 2024.   Test reviewed personally.  No ischemia noted.  Frequent PVCs seen during stress and with recovery.  In light of this finding cardiology consultation was obtained.  He is on valsartan and hydrochlorothiazide for hypertension.  He takes omega-3 fatty acid for dyslipidemia.  Lipids reviewed from 7/24: , HDL 57 and triglycerides 118.  Renal function is normal at the same time.  Stress test reviewed: He walked on the treadmill for 8 minutes, limited by fatigue but no angina.  No ischemic changes noted.  He has some dyspnea.  PVCs noted as above.  Normal blood pressure response with resting hypertension noted.  Reports a family history of atrial fibrillation, heart failure and coronary artery disease in mother. She is alive and 90 years old now. No symptoms from Pvcs.       Past history, family history, social history, current medications, vital signs, recent lab and imaging studies and  prior cardiology studies reviewed independently on this visit.    Allergies   Allergen Reactions    No Active Allergies        Current Outpatient Medications:     busPIRone (BUSPAR) 7.5 mg tablet, TAKE 1 TABLET BY MOUTH TWICE  DAILY AS NEEDED FOR ANXIETY, Disp: 180 tablet, Rfl: 1    hydroCHLOROthiazide 12.5 mg tablet, TAKE 1 TABLET BY MOUTH DAILY, Disp: 90 tablet, Rfl: 1    ketoconazole (NIZORAL) 2 % shampoo, SHAMPOO SCALP 3 TIMES PER WEEK, KEEP ON 5 MINUTES THEN WASH OFF, THEN AS NEEDED., Disp: , Rfl:     Melatonin 5 MG TABS, Take 5 mg by mouth 1 -2 tabs as needed, Disp: , Rfl:     NON FORMULARY, 1 tablet daily Calm Aide, Disp: , Rfl:     Omega-3 Fatty Acids (FISH OIL) 1,000 mg, daily, Disp: , Rfl:     Turmeric 1053 MG TABS, in the morning, Disp: , Rfl:     valsartan (DIOVAN) 160 mg tablet, TAKE 1 TABLET BY MOUTH DAILY, Disp: 90 tablet, Rfl: 3    Past Medical History:   Diagnosis Date    Anxiety 8/11/2022    unsure if this my condition    Hypertension 6/10/2022    AT [facility] exam . said it was elevated    Thyroid nodule 2014    Not  cancerous     Past Surgical History:   Procedure Laterality Date    HERNIA REPAIR  1964    Toddler double hernia    AR RPR AA HERNIA 1ST < 3 CM REDUCIBLE N/A 8/2/2023    Procedure: REPAIR HERNIA UMBILICAL;  Surgeon: Armando Ovalles MD;  [facility]: [facility];  Service: General    THYROIDECTOMY, PARTIAL       Family History   Problem Relation Age of Onset    Breast cancer Mother     Hypertension Mother     Atrial fibrillation Mother     Heart disease Mother         Cog. Heart failure    Hearing loss Mother     Coronary artery disease Father     Dementia Father         some mental issues     Social History   reports that he has never smoked. He has never used smokeless tobacco. He reports current alcohol use of about 10.0 standard drinks of alcohol per week. He reports that he does not use drugs.     Review of Systems   Constitutional:  Positive for fatigue.   HENT: Negative.     Eyes: Negative.    Respiratory:  Positive for shortness of breath. Negative for apnea.    Cardiovascular:  Negative for chest pain, palpitations and leg swelling.   Gastrointestinal: Negative.    Endocrine: Negative.    Musculoskeletal: Negative.    Neurological:  Negative for syncope.   Hematological: Negative.    All other systems reviewed and are negative.      Objective:     Vitals:    01/08/25 1449   BP: 142/88   Pulse: 74   SpO2: 99%     Physical Exam  Vitals reviewed.   Constitutional:       General: He is not in acute distress.  HENT:      Head: Normocephalic.   Cardiovascular:      Rate and Rhythm: Normal rate and regular rhythm. Frequent Extrasystoles are present.     Heart sounds: S1 normal and S2 normal. Murmur heard.      Crescendo systolic murmur is present with a grade of 2/6.   Pulmonary:      Breath sounds: No wheezing or rales.   Musculoskeletal:         General: No swelling.      Right lower leg: No edema.      Left lower leg: No edema.   Skin:     General: Skin is warm.   Neurological:      General: No focal deficit  "present.      Mental Status: He is alert.   Psychiatric:         Mood and Affect: Mood normal.         Pertinent Laboratory/Diagnostic Studies:    Laboratory studies reviewed personally by Jonatan Singer MD    BMP:   Lab Results   Component Value Date    SODIUM 138 07/21/2023    K 3.8 07/21/2023     07/21/2023    CO2 28 07/21/2023    BUN 18 07/21/2023    CREATININE 0.99 07/21/2023    GLUC 84 07/21/2023    CALCIUM 9.1 07/21/2023     CBC:  Lab Results   Component Value Date    WBC 9.26 07/21/2023    HGB 15.4 07/21/2023    HCT 46.4 07/21/2023    MCV 92 07/21/2023     07/21/2023     Other labs:    Lab Results   Component Value Date    ALT 19 07/21/2023    AST 17 07/21/2023     Lab Results   Component Value Date    HGBA1C 5.6 04/03/2024       Imaging Studies:     Pertinent cardiac studies and imaging studies were personally reviewed on this visit and results summarized.    I have spent a total time of 61  minutes in caring for this patient on the day of the visit/encounter including reviewing Diagnostic results, Instructions for management, Patient education, Risk factor reductions, Impressions, Documenting in the medical record, Reviewing / ordering tests, medicine, procedures  , and Obtaining or reviewing history  .    Portions of the record may have been created with voice recognition software.  Occasional wrong word or \"sound alike\" substitutions may have occurred due to the inherent limitations of voice recognition software.  Read the chart carefully and recognize, using context, where substitutions have occurred. Please reach out to me directly for any clarifications.  "

## 2025-01-08 NOTE — LETTER
2025     Murray Jimenes DO  [address] [address]    Patient: Jean-Pierre Rodríguez   YOB: 1960   Date of Visit: 2025       Dear Dr. Jimenes:    Thank you for referring Jean-Pierre Rodríguez to me for evaluation. Below are my notes for this consultation.    If you have questions, please do not hesitate to call me. I look forward to following your patient along with you.         Sincerely,        Jonatan Singer MD        CC: No Recipients    Jonatan Singer MD  2025  3:06 PM  Incomplete  [facility] [place]  [address]  [address]  [facility] [address]  Phone#  [phone]  Fax#  [phone]  [facility] Cardiology Office Consultation             NAME: Jean-Pierre Rodríguez  AGE: 64 y.o. SEX: male   : 1960   MRN: [id number]    DATE: 2025  TIME: 3:06 PM    Cardiology Problem list:  PVCs on treadmill:  10/24: Stress test-no ischemia, frequent PVCs  during stress and recovery  Prediabetes  Dyslipidemia  Hypertension      Assessment & Plan  PVC (premature ventricular contraction)  PVCs noted on stress test.  Study reviewed.  Frequent PVCs during exercise and recovery.  No definite ischemia on stress testing.  Functional capacity average.  EKG- abnormal: NSR, PVC, RAD.  Echo requested for evaluation of LV function.  Holter to   Consider further ischemic evaluation with  calcium score.  Orders:  •  POCT ECG  •  Ambulatory Referral to Cardiology  •  Holter monitor; Future  •  Echo complete w/ contrast if indicated; Future  •  CT coronary calcium score; Future    Mixed hyperlipidemia  Lipids from  reviewed: , HDL 57, triglycerides 118.  Orders:  •  CT coronary calcium score; Future    Primary hypertension  BP Readings from Last 3 Encounters:   25 142/88   10/02/24 126/78   24 150/70   Continue lifestyle modification.   Medical therapy reviewed.  Close blood pressure monitoring.         Dyspnea on exertion  Recent report of exertional  dyspnea and fatigue.  Symptoms also noted on treadmill.  Echocardiogram requested for evaluation of LV function.  Further ischemic evaluation advised.  Discussed coronary CTA versus calcium scoring.  Orders:  •  Echo complete w/ contrast if indicated; Future  •  CT coronary calcium score; Future           HPI:    Jean-Pierre Rodríguez is a 64 y.o.-year-old male who presents to the cardiology clinic for initial consultation.  He has a history of prediabetes, hypertension, dyslipidemia and family history of coronary artery disease.  He was seen by Dr. Jimenes back in August and underwent a stress test in October 2024.  Test reviewed personally.  No ischemia noted.  Frequent PVCs seen during stress and with recovery.  In light of this finding cardiology consultation was obtained.  He is on valsartan and hydrochlorothiazide for hypertension.  He takes omega-3 fatty acid for dyslipidemia.  Lipids reviewed from 7/24: , HDL 57 and triglycerides 118.  Renal function is normal at the same time.  Stress test reviewed: He walked on the treadmill for 8 minutes, limited by fatigue but no angina.  No ischemic changes noted.  He has some dyspnea.  PVCs noted as above.  Normal blood pressure response with resting hypertension noted.  Reports a family history of atrial fibrillation, heart failure and coronary artery disease in mother. She is alive and 90 years old now. No symptoms from Pvcs.       Past history, family history, social history, current medications, vital signs, recent lab and imaging studies and  prior cardiology studies reviewed independently on this visit.    Allergies   Allergen Reactions   • No Active Allergies        Current Outpatient Medications:   •  busPIRone (BUSPAR) 7.5 mg tablet, TAKE 1 TABLET BY MOUTH TWICE  DAILY AS NEEDED FOR ANXIETY, Disp: 180 tablet, Rfl: 1  •  hydroCHLOROthiazide 12.5 mg tablet, TAKE 1 TABLET BY MOUTH DAILY, Disp: 90 tablet, Rfl: 1  •  ketoconazole (NIZORAL) 2 % shampoo, SHAMPOO SCALP  3 TIMES PER WEEK, KEEP ON 5 MINUTES THEN WASH OFF, THEN AS NEEDED., Disp: , Rfl:   •  Melatonin 5 MG TABS, Take 5 mg by mouth 1 -2 tabs as needed, Disp: , Rfl:   •  NON FORMULARY, 1 tablet daily Calm Aide, Disp: , Rfl:   •  Omega-3 Fatty Acids (FISH OIL) 1,000 mg, daily, Disp: , Rfl:   •  Turmeric 1053 MG TABS, in the morning, Disp: , Rfl:   •  valsartan (DIOVAN) 160 mg tablet, TAKE 1 TABLET BY MOUTH DAILY, Disp: 90 tablet, Rfl: 3    Past Medical History:   Diagnosis Date   • Anxiety 8/11/2022    unsure if this my condition   • Hypertension 6/10/2022    AT [facility] exam . said it was elevated   • Thyroid nodule 2014    Not cancerous     Past Surgical History:   Procedure Laterality Date   • HERNIA REPAIR  1964    Toddler double hernia   • CO RPR AA HERNIA 1ST < 3 CM REDUCIBLE N/A 8/2/2023    Procedure: REPAIR HERNIA UMBILICAL;  Surgeon: Armando Ovalles MD;  [facility];  Service: General   • THYROIDECTOMY, PARTIAL       Family History   Problem Relation Age of Onset   • Breast cancer Mother    • Hypertension Mother    • Atrial fibrillation Mother    • Heart disease Mother         Cog. Heart failure   • Hearing loss Mother    • Coronary artery disease Father    • Dementia Father         some mental issues     Social History   reports that he has never smoked. He has never used smokeless tobacco. He reports current alcohol use of about 10.0 standard drinks of alcohol per week. He reports that he does not use drugs.     Review of Systems   Constitutional:  Positive for fatigue.   HENT: Negative.     Eyes: Negative.    Respiratory:  Positive for shortness of breath. Negative for apnea.    Cardiovascular:  Negative for chest pain, palpitations and leg swelling.   Gastrointestinal: Negative.    Endocrine: Negative.    Musculoskeletal: Negative.    Neurological:  Negative for syncope.   Hematological: Negative.    All other systems reviewed and are negative.      Objective:     Vitals:    01/08/25 1449   BP: 142/88    Pulse: 74   SpO2: 99%     Physical Exam  Vitals reviewed.   Constitutional:       General: He is not in acute distress.  HENT:      Head: Normocephalic.   Cardiovascular:      Rate and Rhythm: Normal rate and regular rhythm. Frequent Extrasystoles are present.     Heart sounds: S1 normal and S2 normal. Murmur heard.      Crescendo systolic murmur is present with a grade of 2/6.   Pulmonary:      Breath sounds: No wheezing or rales.   Musculoskeletal:         General: No swelling.      Right lower leg: No edema.      Left lower leg: No edema.   Skin:     General: Skin is warm.   Neurological:      General: No focal deficit present.      Mental Status: He is alert.   Psychiatric:         Mood and Affect: Mood normal.         Pertinent Laboratory/Diagnostic Studies:    Laboratory studies reviewed personally by Jonatan Singer MD    BMP:   Lab Results   Component Value Date    SODIUM 138 07/21/2023    K 3.8 07/21/2023     07/21/2023    CO2 28 07/21/2023    BUN 18 07/21/2023    CREATININE 0.99 07/21/2023    GLUC 84 07/21/2023    CALCIUM 9.1 07/21/2023     CBC:  Lab Results   Component Value Date    WBC 9.26 07/21/2023    HGB 15.4 07/21/2023    HCT 46.4 07/21/2023    MCV 92 07/21/2023     07/21/2023     Other labs:    Lab Results   Component Value Date    ALT 19 07/21/2023    AST 17 07/21/2023     Lab Results   Component Value Date    HGBA1C 5.6 04/03/2024       Imaging Studies:     Pertinent cardiac studies and imaging studies were personally reviewed on this visit and results summarized.    I have spent a total time of 45  minutes in caring for this patient on the day of the visit/encounter including reviewing Diagnostic results, Instructions for management, Patient education, Risk factor reductions, Impressions, Documenting in the medical record, Reviewing / ordering tests, medicine, procedures  , and Obtaining or reviewing history  .    Portions of the record may have been created with voice recognition  "software.  Occasional wrong word or \"sound alike\" substitutions may have occurred due to the inherent limitations of voice recognition software.  Read the chart carefully and recognize, using context, where substitutions have occurred. Please reach out to me directly for any clarifications.    Jonatan Singer MD  2025  2:54 PM  Sign when Signing Visit  [facility] [place]  [address]  [address]  [facility] [address]  Phone#  [phone]  Fax#  [phone]  [facility] Cardiology Office Consultation             NAME: Jean-Pierre Rodríguez  AGE: 64 y.o. SEX: male   : 1960   MRN: [id number]    DATE: 2025  TIME: 2:52 PM    Cardiology Problem list:  PVCs on treadmill:  10/24: Stress test-no ischemia, frequent PVCs  during stress and recovery  Prediabetes  Dyslipidemia  Hypertension      Assessment & Plan  PVC (premature ventricular contraction)  PVCs noted on stress test.  Study reviewed.  Frequent PVCs during exercise and recovery.  No definite ischemia on stress testing.  Functional capacity average.  Echo requested for evaluation of LV function.  Consider further ischemic evaluation with coronary CTA or calcium score.  Orders:  •  POCT ECG  •  Ambulatory Referral to Cardiology    Mixed hyperlipidemia  Lipids from  reviewed: , HDL 57, triglycerides 118.       Primary hypertension  BP Readings from Last 3 Encounters:   25 142/88   10/02/24 126/78   24 150/70   Continue lifestyle modification.   Medical therapy reviewed.  Close blood pressure monitoring.         Dyspnea on exertion  Recent report of exertional dyspnea and fatigue.  Symptoms also noted on treadmill.  Echocardiogram requested for evaluation of LV function.  Further ischemic evaluation advised.  Discussed coronary CTA versus calcium scoring.              HPI:    Jean-Pierre Rodríguez is a 64 y.o.-year-old male who presents to the cardiology clinic for initial consultation.  He has a history of prediabetes, " hypertension, dyslipidemia and family history of coronary artery disease.  He was seen by Dr. Jimenes back in August and underwent a stress test in October 2024.  Test reviewed personally.  No ischemia noted.  Frequent PVCs seen during stress and with recovery.  In light of this finding cardiology consultation was obtained.  He is on valsartan and hydrochlorothiazide for hypertension.  He takes omega-3 fatty acid for dyslipidemia.  Lipids reviewed from 7/24: , HDL 57 and triglycerides 118.  Renal function is normal at the same time.  Stress test reviewed: He walked on the treadmill for 8 minutes, limited by fatigue but no angina.  No ischemic changes noted.  He has some dyspnea.  PVCs noted as above.  Normal blood pressure response with resting hypertension noted.  Reports a family history of atrial fibrillation, heart failure and coronary artery disease.    Past history, family history, social history, current medications, vital signs, recent lab and imaging studies and  prior cardiology studies reviewed independently on this visit.    Allergies   Allergen Reactions   • No Active Allergies        Current Outpatient Medications:   •  busPIRone (BUSPAR) 7.5 mg tablet, TAKE 1 TABLET BY MOUTH TWICE  DAILY AS NEEDED FOR ANXIETY, Disp: 180 tablet, Rfl: 1  •  hydroCHLOROthiazide 12.5 mg tablet, TAKE 1 TABLET BY MOUTH DAILY, Disp: 90 tablet, Rfl: 1  •  ketoconazole (NIZORAL) 2 % shampoo, SHAMPOO SCALP 3 TIMES PER WEEK, KEEP ON 5 MINUTES THEN WASH OFF, THEN AS NEEDED., Disp: , Rfl:   •  Melatonin 5 MG TABS, Take 5 mg by mouth 1 -2 tabs as needed, Disp: , Rfl:   •  NON FORMULARY, 1 tablet daily Calm Aide, Disp: , Rfl:   •  Omega-3 Fatty Acids (FISH OIL) 1,000 mg, daily, Disp: , Rfl:   •  Turmeric 1053 MG TABS, in the morning, Disp: , Rfl:   •  valsartan (DIOVAN) 160 mg tablet, TAKE 1 TABLET BY MOUTH DAILY, Disp: 90 tablet, Rfl: 3    Past Medical History:   Diagnosis Date   • Anxiety 8/11/2022    unsure if this my  condition   • Hypertension 6/10/2022    AT [facility] exam . said it was elevated   • Thyroid nodule 2014    Not cancerous     Past Surgical History:   Procedure Laterality Date   • HERNIA REPAIR  1964    Toddler double hernia   • [place] RPR AA HERNIA 1ST < 3 CM REDUCIBLE N/A 8/2/2023    Procedure: REPAIR HERNIA UMBILICAL;  Surgeon: Armando Ovalles MD;  [facility];  Service: General   • THYROIDECTOMY, PARTIAL       Family History   Problem Relation Age of Onset   • Breast cancer Mother    • Hypertension Mother    • Atrial fibrillation Mother    • Heart disease Mother         Cog. Heart failure   • Hearing loss Mother    • Coronary artery disease Father    • Dementia Father         some mental issues     Social History   reports that he has never smoked. He has never used smokeless tobacco. He reports current alcohol use of about 10.0 standard drinks of alcohol per week. He reports that he does not use drugs.     Review of Systems   Constitutional:  Positive for fatigue.   HENT: Negative.     Eyes: Negative.    Respiratory:  Positive for shortness of breath.    Cardiovascular:  Negative for chest pain, palpitations and leg swelling.   Gastrointestinal: Negative.    Endocrine: Negative.    Neurological:  Negative for syncope.   Hematological: Negative.    All other systems reviewed and are negative.      Objective:     Vitals:    01/08/25 1449   BP: 142/88   Pulse: 78   SpO2: 99%     Physical Exam  Vitals reviewed.   Constitutional:       General: He is not in acute distress.  HENT:      Head: Normocephalic.   Cardiovascular:      Rate and Rhythm: Normal rate and regular rhythm.      Heart sounds: No murmur heard.  Pulmonary:      Breath sounds: No wheezing or rales.   Musculoskeletal:         General: No swelling.      Right lower leg: No edema.      Left lower leg: No edema.   Skin:     General: Skin is warm.   Neurological:      General: No focal deficit present.      Mental Status: He is alert.   Psychiatric:    "      Mood and Affect: Mood normal.         Pertinent Laboratory/Diagnostic Studies:    Laboratory studies reviewed personally by Jonatan Singer MD    BMP:   Lab Results   Component Value Date    SODIUM 138 07/21/2023    K 3.8 07/21/2023     07/21/2023    CO2 28 07/21/2023    BUN 18 07/21/2023    CREATININE 0.99 07/21/2023    GLUC 84 07/21/2023    CALCIUM 9.1 07/21/2023     CBC:  Lab Results   Component Value Date    WBC 9.26 07/21/2023    HGB 15.4 07/21/2023    HCT 46.4 07/21/2023    MCV 92 07/21/2023     07/21/2023     Other labs:    Lab Results   Component Value Date    ALT 19 07/21/2023    AST 17 07/21/2023     Lab Results   Component Value Date    HGBA1C 5.6 04/03/2024       Imaging Studies:     Pertinent cardiac studies and imaging studies were personally reviewed on this visit and results summarized.    I have spent a total time of 45  minutes in caring for this patient on the day of the visit/encounter including reviewing Diagnostic results, Instructions for management, Patient education, Risk factor reductions, Impressions, Documenting in the medical record, Reviewing / ordering tests, medicine, procedures  , and Obtaining or reviewing history  .    Portions of the record may have been created with voice recognition software.  Occasional wrong word or \"sound alike\" substitutions may have occurred due to the inherent limitations of voice recognition software.  Read the chart carefully and recognize, using context, where substitutions have occurred. Please reach out to me directly for any clarifications.  "

## 2025-01-08 NOTE — ASSESSMENT & PLAN NOTE
PVCs noted on stress test.  Study reviewed.  Frequent PVCs during exercise and recovery.  No definite ischemia on stress testing.  Functional capacity average.  EKG- abnormal: NSR, PVC, RAD.  Echo requested for evaluation of LV function.  Holter to   Consider further ischemic evaluation with  calcium score.  Orders:    POCT ECG    Ambulatory Referral to Cardiology    Holter monitor; Future    Echo complete w/ contrast if indicated; Future    CT coronary calcium score; Future

## 2025-01-08 NOTE — ASSESSMENT & PLAN NOTE
Recent report of exertional dyspnea and fatigue.  Symptoms also noted on treadmill.  Echocardiogram requested for evaluation of LV function.  Further ischemic evaluation advised.  Discussed coronary CTA versus calcium scoring.  Orders:    Echo complete w/ contrast if indicated; Future    CT coronary calcium score; Future

## 2025-01-08 NOTE — PATIENT INSTRUCTIONS
Echocardiogram planned to evaluate heart function and rule out significant valvular heart disease.    Holter.     Cardiac CT Calcium Score:  A CT calcium score exam, also known as a coronary calcium scan, is a quick, convenient and noninvasive way of evaluating the amount of calcified (hard) plaque in your heart vessels. The level of calcium equates to the extent of plaque build-up in your arteries. Plaque in the arteries can cause heart attacks.    The radiologist reads the images and sends your cardiologist a report with a calcium score. Patients with higher scores have a greater risk for a heart attack, heart disease or stroke. Knowing your score can help us decide on blood pressure and cholesterol goals that will minimize your risk as much as possible.    The American College of Cardiology found that Coronary artery calcification (CAC) is an excellent cardiovascular disease risk marker and can help guide the decision to use cholesterol reducing medications such as statins. A negative calcium score may reduce the need for statins in otherwise eligible patients. Knowing your score could save your life.    The exam takes less than 10 minutes, is painless and does not require any IV or oral contrast. The exam is typically not covered by insurance. The fee at [facility] radiology locations is $99.

## 2025-01-08 NOTE — ASSESSMENT & PLAN NOTE
BP OK at home.  Continue lifestyle modification.   Medical therapy reviewed.  Close blood pressure monitoring.

## 2025-01-08 NOTE — ASSESSMENT & PLAN NOTE
Lipids from 7/24 reviewed: , HDL 57, triglycerides 118.  Orders:    CT coronary calcium score; Future

## 2025-01-12 ENCOUNTER — HOSPITAL ENCOUNTER (OUTPATIENT)
Dept: CT IMAGING | Facility: HOSPITAL | Age: 65
Discharge: HOME | End: 2025-01-12
Attending: INTERNAL MEDICINE
Payer: COMMERCIAL

## 2025-01-12 DIAGNOSIS — E78.2 MIXED HYPERLIPIDEMIA: ICD-10-CM

## 2025-01-12 DIAGNOSIS — R06.09 DYSPNEA ON EXERTION: ICD-10-CM

## 2025-01-12 DIAGNOSIS — I49.3 PVC (PREMATURE VENTRICULAR CONTRACTION): ICD-10-CM

## 2025-01-12 PROCEDURE — 75571 CT HRT W/O DYE W/CA TEST: CPT

## 2025-01-16 ENCOUNTER — RESULTS FOLLOW-UP (OUTPATIENT)
Dept: CARDIOLOGY CLINIC | Facility: CLINIC | Age: 65
End: 2025-01-16

## 2025-01-16 DIAGNOSIS — I49.3 PVC (PREMATURE VENTRICULAR CONTRACTION): Primary | ICD-10-CM

## 2025-01-30 DIAGNOSIS — F41.9 ANXIETY: ICD-10-CM

## 2025-01-31 ENCOUNTER — HOSPITAL ENCOUNTER (OUTPATIENT)
Dept: NON INVASIVE DIAGNOSTICS | Facility: CLINIC | Age: 65
Discharge: HOME | End: 2025-01-31
Payer: COMMERCIAL

## 2025-01-31 VITALS
SYSTOLIC BLOOD PRESSURE: 142 MMHG | BODY MASS INDEX: 28.29 KG/M2 | HEIGHT: 74 IN | DIASTOLIC BLOOD PRESSURE: 88 MMHG | WEIGHT: 220.46 LBS | HEART RATE: 65 BPM

## 2025-01-31 DIAGNOSIS — R06.09 DYSPNEA ON EXERTION: ICD-10-CM

## 2025-01-31 DIAGNOSIS — I49.3 PVC (PREMATURE VENTRICULAR CONTRACTION): ICD-10-CM

## 2025-01-31 LAB
AORTIC ROOT: 3.5 CM
ASCENDING AORTA: 3.1 CM
BSA FOR ECHO PROCEDURE: 2.26 M2
E WAVE DECELERATION TIME: 350 MS
E/A RATIO: 0.84
FRACTIONAL SHORTENING: 37 (ref 28–44)
INTERVENTRICULAR SEPTUM IN DIASTOLE (PARASTERNAL SHORT AXIS VIEW): 0.9 CM
INTERVENTRICULAR SEPTUM: 0.9 CM (ref 0.6–1.1)
LAAS-AP2: 15.3 CM2
LAAS-AP4: 15.1 CM2
LEFT ATRIUM AREA SYSTOLE SINGLE PLANE A4C: 17.4 CM2
LEFT ATRIUM SIZE: 3.5 CM
LEFT ATRIUM VOLUME (MOD BIPLANE): 42 ML
LEFT ATRIUM VOLUME INDEX (MOD BIPLANE): 18.5 ML/M2
LEFT INTERNAL DIMENSION IN SYSTOLE: 3.3 CM (ref 2.1–4)
LEFT VENTRICULAR INTERNAL DIMENSION IN DIASTOLE: 5.2 CM (ref 3.5–6)
LEFT VENTRICULAR POSTERIOR WALL IN END DIASTOLE: 0.9 CM
LEFT VENTRICULAR STROKE VOLUME: 83 ML
LV EF US.2D.A4C+ESTIMATED: 69 %
LVSV (TEICH): 83 ML
MV E'TISSUE VEL-SEP: 8 CM/S
MV PEAK A VEL: 0.56 M/S
MV PEAK E VEL: 47 CM/S
MV STENOSIS PRESSURE HALF TIME: 102 MS
MV VALVE AREA P 1/2 METHOD: 2.16
RIGHT ATRIUM AREA SYSTOLE A4C: 15.3 CM2
RIGHT VENTRICLE ID DIMENSION: 3.7 CM
SL CV LEFT ATRIUM LENGTH A2C: 4.2 CM
SL CV LV EF: 60
SL CV PED ECHO LEFT VENTRICLE DIASTOLIC VOLUME (MOD BIPLANE) 2D: 127 ML
SL CV PED ECHO LEFT VENTRICLE SYSTOLIC VOLUME (MOD BIPLANE) 2D: 44 ML
TR MAX PG: 23 MMHG
TR PEAK VELOCITY: 2.4 M/S
TRICUSPID ANNULAR PLANE SYSTOLIC EXCURSION: 2.3 CM
TRICUSPID VALVE PEAK REGURGITATION VELOCITY: 2.42 M/S

## 2025-01-31 PROCEDURE — 93225 XTRNL ECG REC<48 HRS REC: CPT

## 2025-01-31 PROCEDURE — 93226 XTRNL ECG REC<48 HR SCAN A/R: CPT

## 2025-01-31 PROCEDURE — 93306 TTE W/DOPPLER COMPLETE: CPT

## 2025-01-31 PROCEDURE — 93306 TTE W/DOPPLER COMPLETE: CPT | Performed by: INTERNAL MEDICINE

## 2025-01-31 RX ORDER — BUSPIRONE HYDROCHLORIDE 7.5 MG/1
7.5 TABLET ORAL 2 TIMES DAILY PRN
Qty: 180 TABLET | Refills: 1 | Status: SHIPPED | OUTPATIENT
Start: 2025-01-31

## 2025-01-31 NOTE — RESULT ENCOUNTER NOTE
ECHO reviewed:   Normal pumping strength of the heart muscle.  Valves: Only mild backflow seen across the pulmonic valve which is not concerning.  Overall no serious abnormalities seen.  Overall these results are reassuring.  Please call with test results.

## 2025-02-04 PROCEDURE — 93227 XTRNL ECG REC<48 HR R&I: CPT | Performed by: STUDENT IN AN ORGANIZED HEALTH CARE EDUCATION/TRAINING PROGRAM

## 2025-02-06 RX ORDER — METOPROLOL SUCCINATE 25 MG/1
25 TABLET, EXTENDED RELEASE ORAL EVERY EVENING
Qty: 30 TABLET | Refills: 11 | Status: SHIPPED | OUTPATIENT
Start: 2025-02-06 | End: 2025-02-20 | Stop reason: SDUPTHER

## 2025-02-06 NOTE — RESULT ENCOUNTER NOTE
Cardiac monitor reviewed.  Overall heart rhythm is normal (sinus rhythm).  As previously noted skipped beats seen arising from the lower cardiac chamber called as PVCs.  7.5% of the total heartbeats were PVCs.  Since the recent echocardiogram showed no abnormalities and the stress test showed no abnormalities except the PVCs, it is reasonable to watch these for now.  I would recommend starting Toprol-XL 25 mg a day to help with PVCs.  Prescription sent to [facility].  We can recheck a heart monitor and echocardiogram at the next visit.    Please call with results.

## 2025-02-20 DIAGNOSIS — I49.3 PVC (PREMATURE VENTRICULAR CONTRACTION): ICD-10-CM

## 2025-02-20 NOTE — TELEPHONE ENCOUNTER
CHANGE OF PHARMACY    Reason for call:   [x] Refill   [] Prior Auth  [] Other:     Office:   [] PCP/Provider -   [x] Specialty/Provider - Cardio    Medication: metoprolol succinate (TOPROL-XL) 25 mg 24 hr tablet     Dose/Frequency:  Take 1 tablet (25 mg total) by mouth every evening,     Quantity: 30 tablet    Pharmacy: [facility] - [place], [place] -      Does the patient have enough for 3 days?   [x] Yes   [] No - Send as HP to POD

## 2025-02-21 RX ORDER — METOPROLOL SUCCINATE 25 MG/1
25 TABLET, EXTENDED RELEASE ORAL EVERY EVENING
Qty: 30 TABLET | Refills: 5 | Status: SHIPPED | OUTPATIENT
Start: 2025-02-21

## 2025-03-07 ENCOUNTER — OFFICE VISIT (OUTPATIENT)
Dept: INTERNAL MEDICINE CLINIC | Facility: CLINIC | Age: 65
End: 2025-03-07
Payer: COMMERCIAL

## 2025-03-07 VITALS
DIASTOLIC BLOOD PRESSURE: 90 MMHG | BODY MASS INDEX: 27.99 KG/M2 | WEIGHT: 218 LBS | SYSTOLIC BLOOD PRESSURE: 140 MMHG | HEART RATE: 69 BPM | OXYGEN SATURATION: 99 %

## 2025-03-07 DIAGNOSIS — I49.3 PVC (PREMATURE VENTRICULAR CONTRACTION): ICD-10-CM

## 2025-03-07 DIAGNOSIS — I10 PRIMARY HYPERTENSION: ICD-10-CM

## 2025-03-07 DIAGNOSIS — E78.2 MIXED HYPERLIPIDEMIA: ICD-10-CM

## 2025-03-07 DIAGNOSIS — E66.3 OVERWEIGHT (BMI 25.0-29.9): ICD-10-CM

## 2025-03-07 DIAGNOSIS — Z12.11 SCREENING FOR COLON CANCER: Primary | ICD-10-CM

## 2025-03-07 DIAGNOSIS — F41.9 ANXIETY: ICD-10-CM

## 2025-03-07 DIAGNOSIS — R73.03 PREDIABETES: ICD-10-CM

## 2025-03-07 DIAGNOSIS — E04.1 THYROID NODULE: ICD-10-CM

## 2025-03-07 PROCEDURE — 99214 OFFICE O/P EST MOD 30 MIN: CPT | Performed by: INTERNAL MEDICINE

## 2025-03-07 NOTE — PROGRESS NOTES
Name: Jean-Pierre Rodríguez      : 1960      MRN: [id number]  Encounter Provider: Murray Jimenes DO  Encounter Date: 3/7/2025   [facility]: [facility]  :  Assessment & Plan  Screening for colon cancer    Orders:  •  Ambulatory Referral to Gastroenterology; Future    Thyroid nodule  Status post partial thyroidectomy regarding nodule which was benign patient will need periodic surveillance monitoring third-generation TSH check  Orders:  •  TSH, 3rd generation with Free T4 reflex; Future    Primary hypertension  Hypertension -mild elevation at 140/90 patient mildly anxious and blood pressure tendency to be elevated in the office I would like the patient to monitor home blood pressure readings and if remains elevated to notify me continue lifestyle modifications  Orders:  •  Comprehensive metabolic panel; Future  Currently on Toprol-XL 25 mg once daily, Diovan 160 mg once daily  Prediabetes  I have counselled the pt to follow a healthy and balanced diet ,and recommend routine exercise.       Overweight (BMI 25.0-29.9)  I have counselled the pt to follow a healthy and balanced diet ,and recommend routine exercise.         Mixed hyperlipidemia  Low-cholesterol diet lipid level being ordered through his occupational health doctor he will bring a copy to me of the test results       PVC (premature ventricular contraction)  Clinically stable and doing well continue the current medical regiment will continue monitor.  Working with cardiologist currently on Toprol-XL 25 mg once daily     RTO in 6 months call if any problems  Anxiety  Patient does report to me improvements in the anxiety levels he is tolerating BuSpar currently on BuSpar 7.5 mg twice daily as needed will continue monitor             Depression Screening and Follow-up Plan: Patient was screened for depression during today's encounter. They screened negative with a PHQ-2 score of 0.        History of Present Illness   HPI  65-year old male coming in for a follow up visit regarding history of thyroid nodule status post thyroidectomy, hypertension, prediabetes, overweight, hyperlipidemia, PVC anxiety; the patient reports me compliant taking medications without untoward side effects the.  The patient is here to review his medical condition, update me on the medical condition and the patient reports me no hospitalizations and no ER visits.  No injuries no illnesses reports anxiety levels are stable on BuSpar tolerating BuSpar well.  Follow-up and balanced diet he does get his laboratories completed through the ocular medicine doctor for [facility] [employer].  Patient reports me his home blood pressure readings are usually doing well but has not checked recently.  No injuries no illnesses he has been to cardiologist Dr. Singer sleeping 6-7 hour more calm overall stable and doing well.  Will bring a copy of his laboratories which are complete labs from the occupational medicine doctor  Review of Systems   Constitutional:  Negative for activity change, appetite change and unexpected weight change.   HENT:  Negative for congestion and postnasal drip.    Eyes:  Negative for visual disturbance.   Respiratory:  Negative for cough and shortness of breath.    Cardiovascular:  Negative for chest pain.   Gastrointestinal:  Negative for abdominal pain, diarrhea, nausea and vomiting.   Neurological:  Negative for dizziness, light-headedness and headaches.   Hematological:  Negative for adenopathy.       Objective   /90   Pulse 69   Wt 98.9 kg (218 lb)   SpO2 99%   BMI 27.99 kg/m²      Physical Exam  Vitals and nursing note reviewed.   Constitutional:       General: He is not in acute distress.     Appearance: Normal appearance. He is well-developed. He is not ill-appearing, toxic-appearing or diaphoretic.   HENT:      Head: Normocephalic and atraumatic.      Right Ear: External ear normal.      Left Ear: External ear normal.      Nose: Nose  normal.   Eyes:      Pupils: Pupils are equal, round, and reactive to light.   Cardiovascular:      Rate and Rhythm: Normal rate and regular rhythm.      Heart sounds: Normal heart sounds. No murmur heard.  Pulmonary:      Effort: Pulmonary effort is normal.      Breath sounds: Normal breath sounds.   Abdominal:      General: There is no distension.      Palpations: Abdomen is soft.      Tenderness: There is no abdominal tenderness. There is no guarding.   Neurological:      Mental Status: He is alert.   Psychiatric:         Mood and Affect: Mood is anxious. Mood is not depressed.         Thought Content: Thought content does not include suicidal ideation.       Administrative Statements   I have spent a total time of  minutes in caring for this patient on the day of the visit/encounter including Diagnostic results, Impressions, Counseling / Coordination of care, Documenting in the medical record, Reviewing/placing orders in the medical record (including tests, medications, and/or procedures), and Obtaining or reviewing history  .

## 2025-03-07 NOTE — ASSESSMENT & PLAN NOTE
Status post partial thyroidectomy regarding nodule which was benign patient will need periodic surveillance monitoring third-generation TSH check  Orders:  •  TSH, 3rd generation with Free T4 reflex; Future

## 2025-03-07 NOTE — ASSESSMENT & PLAN NOTE
Hypertension -mild elevation at 140/90 patient mildly anxious and blood pressure tendency to be elevated in the office I would like the patient to monitor home blood pressure readings and if remains elevated to notify me continue lifestyle modifications  Orders:  •  Comprehensive metabolic panel; Future  Currently on Toprol-XL 25 mg once daily, Diovan 160 mg once daily

## 2025-03-09 NOTE — ASSESSMENT & PLAN NOTE
I have counselled the pt to follow a healthy and balanced diet ,and recommend routine exercise.

## 2025-03-09 NOTE — ASSESSMENT & PLAN NOTE
Low-cholesterol diet lipid level being ordered through his occupational health doctor he will bring a copy to me of the test results

## 2025-03-09 NOTE — ASSESSMENT & PLAN NOTE
Patient does report to me improvements in the anxiety levels he is tolerating BuSpar currently on BuSpar 7.5 mg twice daily as needed will continue monitor

## 2025-03-09 NOTE — ASSESSMENT & PLAN NOTE
Clinically stable and doing well continue the current medical regiment will continue monitor.  Working with cardiologist currently on Toprol-XL 25 mg once daily     RTO in 6 months call if any problems

## 2025-04-24 ENCOUNTER — PREP FOR PROCEDURE (OUTPATIENT)
Age: 65
End: 2025-04-24

## 2025-04-24 ENCOUNTER — TELEPHONE (OUTPATIENT)
Age: 65
End: 2025-04-24

## 2025-04-24 DIAGNOSIS — Z12.11 SCREENING FOR COLON CANCER: Primary | ICD-10-CM

## 2025-04-24 NOTE — TELEPHONE ENCOUNTER
Scheduled date of colonoscopy (as of today): 07/18/25  Physician performing colonoscopy: Dr. Nguyen  Location of colonoscopy: [facility]  Bowel prep reviewed with patient: JAE/SANTOSH via email [email or web address]  Instructions reviewed with patient by: Ernst  Clearances: n/a

## 2025-04-24 NOTE — TELEPHONE ENCOUNTER
Patient called in to make an appt for a colonoscopy. Patient was expecting to just make appt for procedure and did not want to have to have a consult first. He states his wife just had one with Dr Nguyen but did not need consult. Advised gastro may work differently than our office, he is going to call them to see if he can make an appt for colonoscopy without consult.

## 2025-04-24 NOTE — TELEPHONE ENCOUNTER
04/24/25  Screened by: Karen Yang    Referring Provider Dr. Jimenes    Pre- Screening:     There is no height or weight on file to calculate BMI.  Has patient been referred for a routine screening Colonoscopy? yes  Is the patient between 45-75 years old? yes      Previous Colonoscopy yes   If yes:    Date: 09/02/2016    Facility: [facility]     Reason: history of polyps        Does the patient want to see a Gastroenterologist prior to their procedure OR are they having any GI symptoms? no    Has the patient been hospitalized or had abdominal surgery in the past 6 months? no    Does the patient use supplemental oxygen? no    Does the patient take Coumadin, Lovenox, Plavix, Elliquis, Xarelto, or other blood thinning medication? no    Has the patient had a stroke, cardiac event, or stent placed in the past year? no        If patient is between 45yrs - 49yrs, please advise patient that we will have to confirm benefits & coverage with their insurance company for a routine screening colonoscopy.

## 2025-06-16 DIAGNOSIS — I10 ESSENTIAL HYPERTENSION: ICD-10-CM

## 2025-06-17 RX ORDER — HYDROCHLOROTHIAZIDE 12.5 MG/1
12.5 TABLET ORAL DAILY
Qty: 90 TABLET | Refills: 0 | Status: SHIPPED | OUTPATIENT
Start: 2025-06-17

## 2025-06-17 RX ORDER — VALSARTAN 160 MG/1
160 TABLET ORAL DAILY
Qty: 90 TABLET | Refills: 0 | Status: SHIPPED | OUTPATIENT
Start: 2025-06-17

## 2025-07-03 ENCOUNTER — ANESTHESIA (OUTPATIENT)
Dept: ANESTHESIOLOGY | Facility: HOSPITAL | Age: 65
End: 2025-07-03

## 2025-07-03 ENCOUNTER — ANESTHESIA EVENT (OUTPATIENT)
Dept: ANESTHESIOLOGY | Facility: HOSPITAL | Age: 65
End: 2025-07-03

## 2025-07-18 ENCOUNTER — ANESTHESIA (OUTPATIENT)
Dept: GASTROENTEROLOGY | Facility: AMBULARY SURGERY CENTER | Age: 65
End: 2025-07-18
Payer: COMMERCIAL

## 2025-07-18 ENCOUNTER — HOSPITAL ENCOUNTER (OUTPATIENT)
Dept: GASTROENTEROLOGY | Facility: AMBULARY SURGERY CENTER | Age: 65
Setting detail: OUTPATIENT SURGERY
End: 2025-07-18
Attending: INTERNAL MEDICINE
Payer: COMMERCIAL

## 2025-07-18 VITALS
SYSTOLIC BLOOD PRESSURE: 135 MMHG | HEIGHT: 74 IN | WEIGHT: 200 LBS | RESPIRATION RATE: 16 BRPM | HEART RATE: 57 BPM | DIASTOLIC BLOOD PRESSURE: 75 MMHG | OXYGEN SATURATION: 98 % | TEMPERATURE: 98 F | BODY MASS INDEX: 25.67 KG/M2

## 2025-07-18 DIAGNOSIS — Z12.11 SCREENING FOR COLON CANCER: ICD-10-CM

## 2025-07-18 DIAGNOSIS — I49.3 PVC (PREMATURE VENTRICULAR CONTRACTION): ICD-10-CM

## 2025-07-18 PROCEDURE — 88305 TISSUE EXAM BY PATHOLOGIST: CPT | Performed by: PATHOLOGY

## 2025-07-18 PROCEDURE — 45385 COLONOSCOPY W/LESION REMOVAL: CPT | Performed by: INTERNAL MEDICINE

## 2025-07-18 RX ORDER — PROPOFOL 10 MG/ML
INJECTION, EMULSION INTRAVENOUS AS NEEDED
Status: DISCONTINUED | OUTPATIENT
Start: 2025-07-18 | End: 2025-07-18

## 2025-07-18 RX ORDER — LIDOCAINE HYDROCHLORIDE 10 MG/ML
INJECTION, SOLUTION EPIDURAL; INFILTRATION; INTRACAUDAL; PERINEURAL AS NEEDED
Status: DISCONTINUED | OUTPATIENT
Start: 2025-07-18 | End: 2025-07-18

## 2025-07-18 RX ORDER — SODIUM CHLORIDE, SODIUM LACTATE, POTASSIUM CHLORIDE, CALCIUM CHLORIDE 600; 310; 30; 20 MG/100ML; MG/100ML; MG/100ML; MG/100ML
INJECTION, SOLUTION INTRAVENOUS CONTINUOUS PRN
Status: DISCONTINUED | OUTPATIENT
Start: 2025-07-18 | End: 2025-07-18

## 2025-07-18 RX ADMIN — SIMETHICONE 30 MG: 20 SUSPENSION/ DROPS ORAL at 09:37

## 2025-07-18 RX ADMIN — LIDOCAINE HYDROCHLORIDE 50 MG: 10 INJECTION, SOLUTION EPIDURAL; INFILTRATION; INTRACAUDAL at 09:34

## 2025-07-18 RX ADMIN — PROPOFOL 50 MG: 10 INJECTION, EMULSION INTRAVENOUS at 09:37

## 2025-07-18 RX ADMIN — PROPOFOL 50 MG: 10 INJECTION, EMULSION INTRAVENOUS at 09:41

## 2025-07-18 RX ADMIN — PROPOFOL 50 MG: 10 INJECTION, EMULSION INTRAVENOUS at 09:44

## 2025-07-18 RX ADMIN — SODIUM CHLORIDE, SODIUM LACTATE, POTASSIUM CHLORIDE, AND CALCIUM CHLORIDE: .6; .31; .03; .02 INJECTION, SOLUTION INTRAVENOUS at 08:43

## 2025-07-18 RX ADMIN — PROPOFOL 50 MG: 10 INJECTION, EMULSION INTRAVENOUS at 09:39

## 2025-07-18 RX ADMIN — PROPOFOL 140 MG: 10 INJECTION, EMULSION INTRAVENOUS at 09:34

## 2025-07-18 NOTE — H&P
"History and Physical -  [facility]  Jean-Pierre Rodríguez 65 y.o. male MRN: [id number]        HPI: 65-year-old male with history of colon polyps.  Regular bowel movements.    Historical Information   Past Medical History[1]  Past Surgical History[2]  Social History   Social History     Substance and Sexual Activity   Alcohol Use Yes    Alcohol/week: 10.0 standard drinks of alcohol    Types: 2 Glasses of wine, 8 Cans of beer per week     Social History     Substance and Sexual Activity   Drug Use Never     Tobacco Use History[3]  Family History[4]    Meds/Allergies     Not in a hospital admission.    Allergies[5]    Objective     Blood pressure 145/70, pulse 58, temperature (!) 97.4 °F (36.3 °C), temperature source Temporal, resp. rate 16, height 6' 2\" (1.88 m), weight 90.7 kg (200 lb), SpO2 97%.    Physical Exam:    Chest- CTA  Heart- RRR  Abdomen- NT/ND  Extremities- No edema    ASSESSMENT:     History of colon polyps    PLAN:    Colonoscopy                   [1]   Past Medical History:  Diagnosis Date    Anxiety 8/11/2022    unsure if this my condition    Colon polyp     Hypertension 6/10/2022    AT [facility] exam . said it was elevated    Thyroid nodule 2014    Not cancerous   [2]   Past Surgical History:  Procedure Laterality Date    HERNIA REPAIR  1964    Toddler double hernia    GA RPR AA HERNIA 1ST < 3 CM REDUCIBLE N/A 8/2/2023    Procedure: REPAIR HERNIA UMBILICAL;  Surgeon: Armando Ovalles MD;  Location: [facility];  Service: General    THYROIDECTOMY, PARTIAL     [3]   Social History  Tobacco Use   Smoking Status Never   Smokeless Tobacco Never   [4]   Family History  Problem Relation Name Age of Onset    Breast cancer Mother Lesa Rodríguez     Hypertension Mother Lesa Rodríguez     Atrial fibrillation Mother Lesa Rodríguez     Heart disease Mother Lesa Rodríguez         Cog. Heart failure    Hearing loss Mother Lesa Rodríguez     Coronary artery disease Father Mark Rodríguez     " Dementia Father Mark Rodríguez         some mental issues   [5]   Allergies  Allergen Reactions    No Active Allergies

## 2025-07-18 NOTE — ANESTHESIA PREPROCEDURE EVALUATION
Procedure:  COLONOSCOPY    Relevant Problems   CARDIO   (+) Dyspnea on exertion   (+) Hyperlipidemia   (+) Hypertension   (+) PVC (premature ventricular contraction)      /RENAL   (+) Benign prostatic hyperplasia      NEURO/PSYCH   (+) Anxiety      PULMONARY   (+) Dyspnea on exertion        Physical Exam    Airway     Mallampati score: IV  TM Distance: >3 FB  Neck ROM: full      Cardiovascular  Cardiovascular exam normal    Dental   No notable dental hx     Pulmonary  Pulmonary exam normal     Neurological      Other Findings        Anesthesia Plan  ASA Score- 2     Anesthesia Type- IV sedation with anesthesia with ASA Monitors.         Additional Monitors:     Airway Plan:            Plan Factors-    Chart reviewed.    Patient summary reviewed.    Patient is not a current smoker.              Induction- intravenous.    Postoperative Plan- .   Monitoring Plan - Monitoring plan - standard ASA monitoring          Informed Consent- Anesthetic plan and risks discussed with patient.  I personally reviewed this patient with the CRNA. Discussed and agreed on the Anesthesia Plan with the CRNA..      NPO Status:  No vitals data found for the desired time range.

## 2025-07-18 NOTE — ANESTHESIA POSTPROCEDURE EVALUATION
Post-Op Assessment Note    CV Status:  Stable  Pain Score: 0    Pain management: adequate       Mental Status:  Arousable and sleepy   Hydration Status:  Stable   PONV Controlled:  None   Airway Patency:  Patent     Post Op Vitals Reviewed: Yes    No anethesia notable event occurred.    Staff: CRNA           Last Filed PACU Vitals:  Vitals Value Taken Time   Temp 97.6 °F (36.4 °C) 07/18/25 09:49   Pulse 56 07/18/25 09:49   BP 89/53 07/18/25 09:49   Resp 18 07/18/25 09:49   SpO2 96 % 07/18/25 09:49       Modified [place]:     Vitals Value Taken Time   Activity 0 07/18/25 09:51   Respiration 2 07/18/25 09:51   Circulation 2 07/18/25 09:51   Consciousness 0 07/18/25 09:51   Oxygen Saturation 2 07/18/25 09:51     Modified [place] Score: 6

## 2025-07-21 RX ORDER — METOPROLOL SUCCINATE 25 MG/1
25 TABLET, EXTENDED RELEASE ORAL EVERY EVENING
Qty: 90 TABLET | Refills: 0 | Status: SHIPPED | OUTPATIENT
Start: 2025-07-21

## 2025-08-20 DIAGNOSIS — F41.9 ANXIETY: ICD-10-CM

## 2025-08-21 RX ORDER — BUSPIRONE HYDROCHLORIDE 7.5 MG/1
7.5 TABLET ORAL 2 TIMES DAILY PRN
Qty: 180 TABLET | Refills: 1 | Status: SHIPPED | OUTPATIENT
Start: 2025-08-21

## (undated) DEVICE — TOWEL SET X-RAY

## (undated) DEVICE — SUT VICRYL 3-0 SH 27 IN J416H

## (undated) DEVICE — GLOVE SRG BIOGEL ECLIPSE 7

## (undated) DEVICE — SUT VICRYL 2-0 REEL 54 IN J286G

## (undated) DEVICE — CHLORAPREP HI-LITE 26ML ORANGE

## (undated) DEVICE — PENCIL ELECTROSURG E-Z CLEAN -0035H

## (undated) DEVICE — NEEDLE 22 G X 1 1/2 SAFETY

## (undated) DEVICE — DECANTER: Brand: UNBRANDED

## (undated) DEVICE — UNDYED BRAIDED (POLYGLACTIN 910), SYNTHETIC ABSORBABLE SUTURE: Brand: COATED VICRYL

## (undated) DEVICE — ADHESIVE SKIN HIGH VISCOSITY EXOFIN 1ML

## (undated) DEVICE — DRAPE EQUIPMENT RF WAND

## (undated) DEVICE — PAD GROUNDING ADULT

## (undated) DEVICE — BETHLEHEM UNIVERSAL MINOR GEN: Brand: CARDINAL HEALTH

## (undated) DEVICE — SUT MONOCRYL 4-0 PS-2 18 IN Y496G